# Patient Record
Sex: FEMALE | Race: WHITE | NOT HISPANIC OR LATINO | Employment: FULL TIME | URBAN - NONMETROPOLITAN AREA
[De-identification: names, ages, dates, MRNs, and addresses within clinical notes are randomized per-mention and may not be internally consistent; named-entity substitution may affect disease eponyms.]

---

## 2017-01-04 ENCOUNTER — OFFICE VISIT (OUTPATIENT)
Dept: MEDICAL GROUP | Facility: PHYSICIAN GROUP | Age: 62
End: 2017-01-04
Payer: COMMERCIAL

## 2017-01-04 VITALS
WEIGHT: 245 LBS | OXYGEN SATURATION: 94 % | RESPIRATION RATE: 16 BRPM | SYSTOLIC BLOOD PRESSURE: 126 MMHG | DIASTOLIC BLOOD PRESSURE: 90 MMHG | HEART RATE: 72 BPM | BODY MASS INDEX: 41.83 KG/M2 | TEMPERATURE: 99.7 F | HEIGHT: 64 IN

## 2017-01-04 DIAGNOSIS — I10 ESSENTIAL HYPERTENSION: ICD-10-CM

## 2017-01-04 DIAGNOSIS — E03.4 HYPOTHYROIDISM DUE TO ACQUIRED ATROPHY OF THYROID: ICD-10-CM

## 2017-01-04 DIAGNOSIS — E78.00 PURE HYPERCHOLESTEROLEMIA: ICD-10-CM

## 2017-01-04 DIAGNOSIS — E66.09 OBESITY DUE TO EXCESS CALORIES, UNSPECIFIED OBESITY SEVERITY: ICD-10-CM

## 2017-01-04 DIAGNOSIS — J06.9 VIRAL UPPER RESPIRATORY TRACT INFECTION: ICD-10-CM

## 2017-01-04 PROCEDURE — 99214 OFFICE O/P EST MOD 30 MIN: CPT | Performed by: INTERNAL MEDICINE

## 2017-01-04 RX ORDER — AMOXICILLIN 500 MG/1
500 CAPSULE ORAL 3 TIMES DAILY
Qty: 30 CAP | Refills: 0 | Status: SHIPPED | OUTPATIENT
Start: 2017-01-04 | End: 2017-10-20

## 2017-01-04 RX ORDER — LEVOTHYROXINE SODIUM 0.12 MG/1
125 TABLET ORAL
Qty: 90 TAB | Refills: 1 | Status: SHIPPED | OUTPATIENT
Start: 2017-01-04 | End: 2017-01-16

## 2017-01-04 NOTE — PATIENT INSTRUCTIONS
flovent 1 puff 2 x a day to see if chronic cough resolves    Amoxicillin 1 tab 3 x a day if URI persists for another 3 days.      My Chart if want to continue the flovent.

## 2017-01-04 NOTE — MR AVS SNAPSHOT
"        Elizabeth Mendoza   2017 8:40 AM   Office Visit   MRN: [id number]    Department:  [facility]   Dept Phone:  [phone]    Description:  Female : 1955   Provider:  Serenity HOUSE M.D.           Reason for Visit     Results labs     Cough pt states cough and headache x 1week       Allergies as of 2017     Allergen Noted Reactions    Biaxin [Clarithromycin] 10/11/2011   Vomiting    Cipro Xr 10/11/2011       Pain Relief 10/11/2011         You were diagnosed with     Obesity due to excess calories, unspecified obesity severity   [2815516]       Viral upper respiratory tract infection   [480318]       Essential hypertension   [7503319]       Pure hypercholesterolemia   [272.0.ICD-9-CM]       Hypothyroidism due to acquired atrophy of thyroid   [2925893]         Vital Signs     Blood Pressure Pulse Temperature Respirations Height Weight    126/90 mmHg 72 37.6 °C (99.7 °F) 16 1.638 m (5' 4.49\") 111.131 kg (245 lb)    Body Mass Index Oxygen Saturation Smoking Status             41.42 kg/m2 94% Never Smoker          Basic Information     Date Of Birth Sex Race Ethnicity Preferred Language    1955 Female White Non- English      Your appointments     2017  7:00 AM   Adult Draw/Collection with [facility]   [facility] (--)    [address] [place] [address]   [phone]            2017  8:00 AM   ANNUAL EXAM PREVENTATIVE with Serenity HOUSE M.D.   [facility] (--)    [address] [id number] [phone]              Problem List              ICD-10-CM Priority Class Noted - Resolved    Hypertension I10   Unknown - Present    Hyperlipidemia E78.5   Unknown - Present    Hypothyroid E03.9   Unknown - Present    Obesity E66.9 Medium  3/6/2013 - Present    Incisional hernia K43.2   3/31/2014 - Present    Reactive airway disease without complication J45.909   2014 - Present    Vitamin D deficiency E55.9   " 2/23/2016 - Present    Anxiety F41.9   2/24/2016 - Present    Viral upper respiratory tract infection J06.9, B97.89   1/4/2017 - Present      Health Maintenance        Date Due Completion Dates    IMM DTaP/Tdap/Td Vaccine (1 - Tdap) 6/27/1974 ---    PAP SMEAR 6/27/1976 ---    IMM ZOSTER VACCINE 6/27/2015 ---    IMM INFLUENZA (1) 9/1/2016 10/3/2014, 9/30/2013    MAMMOGRAM 2/12/2017 2/12/2016 (Done)    Override on 2/12/2016: Done    COLONOSCOPY 2/21/2024 2/21/2014            Current Immunizations     Influenza TIV (IM) 9/30/2013    Influenza Vaccine Quad Inj (Pf) 10/3/2014    Pneumococcal polysaccharide vaccine (PPSV-23) 9/30/2010      Below and/or attached are the medications your provider expects you to take. Review all of your home medications and newly ordered medications with your provider and/or pharmacist. Follow medication instructions as directed by your provider and/or pharmacist. Please keep your medication list with you and share with your provider. Update the information when medications are discontinued, doses are changed, or new medications (including over-the-counter products) are added; and carry medication information at all times in the event of emergency situations     Allergies:  BIAXIN - Vomiting     CIPRO XR - (reactions not documented)     PAIN RELIEF - (reactions not documented)               Medications  Valid as of: January 04, 2017 -  9:42 AM    Generic Name Brand Name Tablet Size Instructions for use    Albuterol Sulfate (Aero Soln) albuterol 108 (90 BASE) MCG/ACT Inhale 2 Puffs by mouth every 6 hours as needed for Shortness of Breath.        ALPRAZolam (Tab) XANAX 0.5 MG Take 1 Tab by mouth at bedtime as needed.        Amoxicillin (Cap) AMOXIL 500 MG Take 1 Cap by mouth 3 times a day.        Aspirin (Tab)  MG Take 325 mg by mouth every 6 hours as needed.        Calcium Polycarbophil   Take  by mouth 2 Times a Day. Takes two        Cholecalciferol (Cap) VITAMIN D3 5000 UNIT Take 1  Cap by mouth every day.        Enalapril Maleate (Tab) VASOTEC 2.5 MG Take 1 Tab by mouth every day.        Famotidine   Take  by mouth.        Fluticasone Propionate HFA (Aerosol) FLOVENT  MCG/ACT Inhale 2 Puffs by mouth 2 times a day.        Levothyroxine Sodium (Tab) SYNTHROID 150 MCG Take 1 Tab by mouth every day.        Levothyroxine Sodium (Tab) SYNTHROID 125 MCG Take 1 Tab by mouth Every morning on an empty stomach.        Omeprazole (CAPSULE DELAYED RELEASE) PRILOSEC 20 MG Take 20 mg by mouth every day.        Simvastatin (Tab) ZOCOR 20 MG Take 1 Tab by mouth every evening.        Triamterene-HCTZ (Cap) MAXZIDE-25/DYAZIDE 37.5-25 MG Take 1 Cap by mouth every day.        .                 Medicines prescribed today were sent to:     [facility]/PHARMACY #[id number] - [place], [place] - [address] [address]    Phone: [phone] Fax: [phone]    Open 24 Hours?: No    [facility] - [address] [facility] [address]    Phone: [phone] Fax: [phone]    Open 24 Hours?: No      Medication refill instructions:       If your prescription bottle indicates you have medication refills left, it is not necessary to call your provider’s office. Please contact your pharmacy and they will refill your medication.    If your prescription bottle indicates you do not have any refills left, you may request refills at any time through one of the following ways: The online [employer] system (except Urgent Care), by calling your provider’s office, or by asking your pharmacy to contact your provider’s office with a refill request. Medication refills are processed only during regular business hours and may not be available until the next business day. Your provider may request additional information or to have a follow-up visit with you prior to refilling your medication.   *Please Note: Medication refills are assigned a new Rx number  when refilled electronically. Your pharmacy may indicate that no refills were authorized even though a new prescription for the same medication is available at the pharmacy. Please request the medicine by name with the pharmacy before contacting your provider for a refill.        Your To Do List     Future Labs/Procedures Complete By Expires    CBC WITH DIFFERENTIAL  As directed 1/4/2018    COMP METABOLIC PANEL  As directed 1/4/2018    LIPID PROFILE  As directed 1/4/2018    VITAMIN D,25 HYDROXY  As directed 1/4/2018      Instructions    flovent 1 puff 2 x a day to see if chronic cough resolves    Amoxicillin 1 tab 3 x a day if URI persists for another 3 days.      My Chart if want to continue the flovent.           [employer] Access Code: Activation code not generated  Current [employer] Status: Active

## 2017-01-04 NOTE — ASSESSMENT & PLAN NOTE
Patient did not attend weight loss program, attributes weight gain to holiday.  Not exercising currently as recent URI.

## 2017-01-04 NOTE — ASSESSMENT & PLAN NOTE
Patient with 1 week of URI, cough, nonproductive, no fever or chills, states she still feels unready to go back to work, low energy.

## 2017-01-06 NOTE — PROGRESS NOTES
Chief Complaint   Patient presents with   • Results     labs    • Cough     pt states cough and headache x 1week        HISTORY OF PRESENT ILLNESS: Patient is a 61 y.o. female established patient who presents today to discuss the medical issues below.    Obesity  Patient did not attend weight loss program, attributes weight gain to holiday.  Not exercising currently as recent URI.      Viral upper respiratory tract infection  Patient with 1 week of URI, cough, nonproductive, no fever or chills, states she still feels unready to go back to work, low energy.      Hyperlipidemia  Patient with weight gain. Labs done, slight increase, on lipitor at 20 mg a day.      Hypothyroid  Patient on meds, no hair loss, constipation or diarrhea.        Patient Active Problem List    Diagnosis Date Noted   • Obesity 03/06/2013     Priority: Medium   • Viral upper respiratory tract infection 01/04/2017   • Anxiety 02/24/2016   • Vitamin D deficiency 02/23/2016   • Reactive airway disease without complication 11/12/2014   • Incisional hernia 03/31/2014   • Hypertension    • Hyperlipidemia    • Hypothyroid        Allergies:Biaxin; Cipro xr; and Pain relief    Current Outpatient Prescriptions   Medication Sig Dispense Refill   • amoxicillin (AMOXIL) 500 MG Cap Take 1 Cap by mouth 3 times a day. 30 Cap 0   • levothyroxine (SYNTHROID) 125 MCG Tab Take 1 Tab by mouth Every morning on an empty stomach. 90 Tab 1   • Famotidine (PEPCID PO) Take  by mouth.     • fluticasone (FLOVENT HFA) 110 MCG/ACT Aerosol Inhale 2 Puffs by mouth 2 times a day. 1 Inhaler 3   • enalapril (VASOTEC) 2.5 MG Tab Take 1 Tab by mouth every day. 90 Tab 3   • simvastatin (ZOCOR) 20 MG Tab Take 1 Tab by mouth every evening. 90 Tab 3   • triamterene/hctz (MAXZIDE-25/DYAZIDE) 37.5-25 MG Cap Take 1 Cap by mouth every day. 90 Cap 3   • alprazolam (XANAX) 0.5 MG Tab Take 1 Tab by mouth at bedtime as needed. 10 Tab 0   • cholecalciferol (VITAMIN D3) 5000 UNIT Cap Take 1  "Cap by mouth every day. 90 Cap 3   • albuterol (VENTOLIN OR PROVENTIL) 108 (90 BASE) MCG/ACT Aero Soln inhalation aerosol Inhale 2 Puffs by mouth every 6 hours as needed for Shortness of Breath. 8.5 g 1   • Calcium Polycarbophil (FIBERCON PO) Take  by mouth 2 Times a Day. Takes two     • aspirin (ASA) 325 MG TABS Take 325 mg by mouth every 6 hours as needed.     • levothyroxine (SYNTHROID) 150 MCG Tab Take 1 Tab by mouth every day. (Patient not taking: Reported on 2017) 30 Tab 1   • omeprazole (PRILOSEC) 20 MG CPDR Take 20 mg by mouth every day.       No current facility-administered medications for this visit.         Past Medical History   Diagnosis Date   • Hypertension    • Hyperlipidemia    • Hypothyroid    • Cholesterol blood decreased    • Heart burn    • Other specified disorder of intestines    • Bronchitis    • Diverticulitis    • Cough 2014   • Vitamin D deficiency 2016   • Anxiety 2016   • Viral upper respiratory tract infection 2017       Social History   Substance Use Topics   • Smoking status: Never Smoker    • Smokeless tobacco: Never Used   • Alcohol Use: No       Family Status   Relation Status Death Age   • Mother Alive    • Father       Family History   Problem Relation Age of Onset   • Stroke Mother    • Hypertension Mother    • Heart Disease Father 55   • Diabetes         ROS:    Respiratory: Negative for shortness of breath or wheezing.    Cardiovascular: Negative for chest pain, palpitations, orthopnea, dyspnea with exertion or edema.   Gastrointestinal: Negative for GI upset, nausea, vomiting, abdominal pain, constipation or diarrhea.   Genitourinary: Negative for dysuria, urgency, hesitancy or frequency.       Exam:    Blood pressure 126/90, pulse 72, temperature 37.6 °C (99.7 °F), resp. rate 16, height 1.638 m (5' 4.49\"), weight 111.131 kg (245 lb), SpO2 94 %.  General:  Well nourished, well developed female in NAD.  HENT: Normocephalic, bilateral TMs are " intact, nasal mucosa with diffuse edema no sinus tenderness to percussion oral mucosal lesions  Neck with no adenopathy bruit stridor  Pulmonary: Clear to ausculation and percussion.  Normal effort. No rales, rhonchi, or wheezing.  Cardiovascular: Regular rate and rhythm without murmur.   Abdomen: Normal bowel sounds soft and nontender no palpable liver spleen bladder mass.  Extremities: No LE edema noted.  Neuro: Grossly nonfocal.  Psych: Alert and oriented to person, place, and time. Appropriate mood and conversation.        This dictation was created using voice recognition software. I have made reasonable attempts to correct errors, however, errors of grammar and content may exist.          Assessment/Plan:    1. Obesity due to excess calories, unspecified obesity severity  Discussed diet, exercise, weight loss, behavioral modification, portion size management.  Weight is up off phentermine she is planning to restart diet and exercise program after she recovers from the URI  - VITAMIN D,25 HYDROXY; Future    2. Viral upper respiratory tract infection  Most likely initially viral however considering the ongoing symptomatology potential for secondary bacterial infection. Prescription for antibiotics given if she is not significantly improved in the next couple of days as amoxicillin. Discussed conservative management    3. Essential hypertension  Borderline she's not feeling well continuing on medications discussed diet exercise weight loss early follow-up consideration for increase in the enalapril when necessary persistence of borderline nature, discussed all monitoring  - COMP METABOLIC PANEL; Future  - CBC WITH DIFFERENTIAL; Future    4. Pure hypercholesterolemia  Continues on Zocor, LDL at 119 discussed increasing dose versus monitor with diet exercise weight loss. Patient declines medication changes will work on her lifestyle management.  - LIPID PROFILE; Future    5. Hypothyroidism due to acquired atrophy  of thyroid  Clinically stable, labs indicate over replaced. Discussed at length with patient decrease dose to 125 µg daily ongoing monitoring.

## 2017-01-16 RX ORDER — LEVOTHYROXINE SODIUM 0.12 MG/1
TABLET ORAL
Qty: 90 TAB | Refills: 1 | Status: SHIPPED | OUTPATIENT
Start: 2017-01-16 | End: 2017-07-06 | Stop reason: SDUPTHER

## 2017-05-08 RX ORDER — LEVOTHYROXINE SODIUM 0.12 MG/1
TABLET ORAL
OUTPATIENT
Start: 2017-05-08

## 2017-06-29 ENCOUNTER — HOSPITAL ENCOUNTER (OUTPATIENT)
Dept: LAB | Facility: MEDICAL CENTER | Age: 62
End: 2017-06-29
Attending: INTERNAL MEDICINE
Payer: COMMERCIAL

## 2017-06-29 DIAGNOSIS — I10 ESSENTIAL HYPERTENSION: ICD-10-CM

## 2017-06-29 DIAGNOSIS — E66.09 OBESITY DUE TO EXCESS CALORIES, UNSPECIFIED OBESITY SEVERITY: ICD-10-CM

## 2017-06-29 DIAGNOSIS — E78.00 PURE HYPERCHOLESTEROLEMIA: ICD-10-CM

## 2017-06-29 LAB
25(OH)D3 SERPL-MCNC: 37 NG/ML (ref 30–100)
ALBUMIN SERPL BCP-MCNC: 3.6 G/DL (ref 3.2–4.9)
ALBUMIN/GLOB SERPL: 1.2 G/DL
ALP SERPL-CCNC: 48 U/L (ref 30–99)
ALT SERPL-CCNC: 36 U/L (ref 2–50)
ANION GAP SERPL CALC-SCNC: 5 MMOL/L (ref 0–11.9)
AST SERPL-CCNC: 25 U/L (ref 12–45)
BASOPHILS # BLD AUTO: 0.8 % (ref 0–1.8)
BASOPHILS # BLD: 0.06 K/UL (ref 0–0.12)
BILIRUB SERPL-MCNC: 0.4 MG/DL (ref 0.1–1.5)
BUN SERPL-MCNC: 13 MG/DL (ref 8–22)
CALCIUM SERPL-MCNC: 8.8 MG/DL (ref 8.5–10.5)
CHLORIDE SERPL-SCNC: 106 MMOL/L (ref 96–112)
CHOLEST SERPL-MCNC: 146 MG/DL (ref 100–199)
CO2 SERPL-SCNC: 28 MMOL/L (ref 20–33)
CREAT SERPL-MCNC: 0.8 MG/DL (ref 0.5–1.4)
EOSINOPHIL # BLD AUTO: 0.34 K/UL (ref 0–0.51)
EOSINOPHIL NFR BLD: 4.7 % (ref 0–6.9)
ERYTHROCYTE [DISTWIDTH] IN BLOOD BY AUTOMATED COUNT: 41.7 FL (ref 35.9–50)
GFR SERPL CREATININE-BSD FRML MDRD: >60 ML/MIN/1.73 M 2
GLOBULIN SER CALC-MCNC: 3 G/DL (ref 1.9–3.5)
GLUCOSE SERPL-MCNC: 105 MG/DL (ref 65–99)
HCT VFR BLD AUTO: 45.9 % (ref 37–47)
HDLC SERPL-MCNC: 39 MG/DL
HGB BLD-MCNC: 14.8 G/DL (ref 12–16)
IMM GRANULOCYTES # BLD AUTO: 0.02 K/UL (ref 0–0.11)
IMM GRANULOCYTES NFR BLD AUTO: 0.3 % (ref 0–0.9)
LDLC SERPL CALC-MCNC: 76 MG/DL
LYMPHOCYTES # BLD AUTO: 2.16 K/UL (ref 1–4.8)
LYMPHOCYTES NFR BLD: 30 % (ref 22–41)
MCH RBC QN AUTO: 28.4 PG (ref 27–33)
MCHC RBC AUTO-ENTMCNC: 32.2 G/DL (ref 33.6–35)
MCV RBC AUTO: 88.1 FL (ref 81.4–97.8)
MONOCYTES # BLD AUTO: 0.53 K/UL (ref 0–0.85)
MONOCYTES NFR BLD AUTO: 7.4 % (ref 0–13.4)
NEUTROPHILS # BLD AUTO: 4.09 K/UL (ref 2–7.15)
NEUTROPHILS NFR BLD: 56.8 % (ref 44–72)
NRBC # BLD AUTO: 0 K/UL
NRBC BLD AUTO-RTO: 0 /100 WBC
PLATELET # BLD AUTO: 296 K/UL (ref 164–446)
PMV BLD AUTO: 10.1 FL (ref 9–12.9)
POTASSIUM SERPL-SCNC: 3.9 MMOL/L (ref 3.6–5.5)
PROT SERPL-MCNC: 6.6 G/DL (ref 6–8.2)
RBC # BLD AUTO: 5.21 M/UL (ref 4.2–5.4)
SODIUM SERPL-SCNC: 139 MMOL/L (ref 135–145)
TRIGL SERPL-MCNC: 157 MG/DL (ref 0–149)
WBC # BLD AUTO: 7.2 K/UL (ref 4.8–10.8)

## 2017-06-29 PROCEDURE — 82306 VITAMIN D 25 HYDROXY: CPT

## 2017-06-29 PROCEDURE — 36415 COLL VENOUS BLD VENIPUNCTURE: CPT

## 2017-06-29 PROCEDURE — 85025 COMPLETE CBC W/AUTO DIFF WBC: CPT

## 2017-06-29 PROCEDURE — 80053 COMPREHEN METABOLIC PANEL: CPT

## 2017-06-29 PROCEDURE — 80061 LIPID PANEL: CPT

## 2017-07-06 ENCOUNTER — OFFICE VISIT (OUTPATIENT)
Dept: MEDICAL GROUP | Facility: PHYSICIAN GROUP | Age: 62
End: 2017-07-06
Payer: COMMERCIAL

## 2017-07-06 VITALS
TEMPERATURE: 97.9 F | OXYGEN SATURATION: 95 % | DIASTOLIC BLOOD PRESSURE: 90 MMHG | HEART RATE: 80 BPM | BODY MASS INDEX: 47.63 KG/M2 | WEIGHT: 279 LBS | SYSTOLIC BLOOD PRESSURE: 144 MMHG | HEIGHT: 64 IN

## 2017-07-06 DIAGNOSIS — E78.00 PURE HYPERCHOLESTEROLEMIA: ICD-10-CM

## 2017-07-06 DIAGNOSIS — E03.4 HYPOTHYROIDISM DUE TO ACQUIRED ATROPHY OF THYROID: ICD-10-CM

## 2017-07-06 DIAGNOSIS — F41.9 ANXIETY: ICD-10-CM

## 2017-07-06 DIAGNOSIS — I10 ESSENTIAL HYPERTENSION: ICD-10-CM

## 2017-07-06 PROCEDURE — 99214 OFFICE O/P EST MOD 30 MIN: CPT | Performed by: INTERNAL MEDICINE

## 2017-07-06 RX ORDER — LEVOTHYROXINE SODIUM 0.12 MG/1
125 TABLET ORAL
Qty: 90 TAB | Refills: 1 | Status: SHIPPED | OUTPATIENT
Start: 2017-07-06 | End: 2017-09-23 | Stop reason: SDUPTHER

## 2017-07-06 RX ORDER — ALPRAZOLAM 0.5 MG/1
0.5 TABLET ORAL NIGHTLY PRN
Qty: 30 TAB | Refills: 0 | Status: SHIPPED | OUTPATIENT
Start: 2017-07-06 | End: 2021-01-26 | Stop reason: SDUPTHER

## 2017-07-06 RX ORDER — SIMVASTATIN 20 MG
20 TABLET ORAL EVERY EVENING
Qty: 90 TAB | Refills: 3 | Status: SHIPPED | OUTPATIENT
Start: 2017-07-06 | End: 2018-04-20 | Stop reason: SDUPTHER

## 2017-07-06 RX ORDER — ENALAPRIL MALEATE 2.5 MG/1
2.5 TABLET ORAL DAILY
Qty: 90 TAB | Refills: 3 | Status: SHIPPED | OUTPATIENT
Start: 2017-07-06 | End: 2018-04-20 | Stop reason: SDUPTHER

## 2017-07-06 RX ORDER — TRIAMTERENE AND HYDROCHLOROTHIAZIDE 37.5; 25 MG/1; MG/1
2 CAPSULE ORAL DAILY
Qty: 180 CAP | Refills: 3 | Status: SHIPPED | OUTPATIENT
Start: 2017-07-06 | End: 2018-04-20 | Stop reason: SDUPTHER

## 2017-07-06 ASSESSMENT — PATIENT HEALTH QUESTIONNAIRE - PHQ9: CLINICAL INTERPRETATION OF PHQ2 SCORE: 0

## 2017-07-06 ASSESSMENT — PAIN SCALES - GENERAL: PAINLEVEL: NO PAIN

## 2017-07-06 NOTE — ASSESSMENT & PLAN NOTE
Patient has insight that once she starts eating food she has trouble stopping but no clear connection to anxiety.  She has new grandbaby who had pyloric stenosis. Rare xanax, mostly for prn.  She does have anger frustration mostly at work.  She has had SSRI 8 years ago with divorce.

## 2017-07-06 NOTE — MR AVS SNAPSHOT
"        Elizabeth Mendoza   2017 8:00 AM   Office Visit   MRN: [id number]    Department:  [facility]   Dept Phone:  [phone]    Description:  Female : 1955   Provider:  Serenity HOUSE M.D.           Reason for Visit     Results labs     Medication Refill levothyroxine, enalapril, simvastain, triamterine hctz, xanax      Allergies as of 2017     Allergen Noted Reactions    Biaxin [Clarithromycin] 10/11/2011   Vomiting    Cipro Xr 10/11/2011       Pain Relief 10/11/2011         You were diagnosed with     Essential hypertension   [1182226]       Hypothyroidism due to acquired atrophy of thyroid   [7522508]       Pure hypercholesterolemia   [272.0.ICD-9-CM]       Anxiety   [396460]         Vital Signs     Blood Pressure Pulse Temperature Height Weight Body Mass Index    144/90 mmHg 80 36.6 °C (97.9 °F) 1.638 m (5' 4.49\") 126.554 kg (279 lb) 47.17 kg/m2    Oxygen Saturation Smoking Status                95% Never Smoker           Basic Information     Date Of Birth Sex Race Ethnicity Preferred Language    1955 Female White Non- English      Your appointments     Oct 11, 2017  7:00 AM   Adult Draw/Collection with [facility]   [facility] (--)    [address]  [place] [address]   [phone]            Oct 20, 2017  4:30 PM   Established Patient with Serenity HOUSE M.D.   [facility] (--)    [address]  [place] [id number] [phone]           You will be receiving a confirmation call a few days before your appointment from our automated call confirmation system.              Problem List              ICD-10-CM Priority Class Noted - Resolved    Hypertension I10   Unknown - Present    Hyperlipidemia E78.5   Unknown - Present    Hypothyroid E03.9   Unknown - Present    Obesity E66.9 Medium  3/6/2013 - Present    Incisional hernia K43.2   3/31/2014 - Present    Reactive airway disease without complication J45.909   2014 - " Present    Vitamin D deficiency E55.9   2/23/2016 - Present    Anxiety F41.9   2/24/2016 - Present    Viral upper respiratory tract infection J06.9, B97.89   1/4/2017 - Present      Health Maintenance        Date Due Completion Dates    IMM DTaP/Tdap/Td Vaccine (1 - Tdap) 6/27/1974 ---    PAP SMEAR 6/27/1976 ---    IMM ZOSTER VACCINE 6/27/2015 ---    MAMMOGRAM 2/12/2017 2/12/2016 (Done)    Override on 2/12/2016: Done    IMM INFLUENZA (1) 9/1/2017 10/3/2014, 9/30/2013    COLONOSCOPY 2/21/2024 2/21/2014            Current Immunizations     Influenza TIV (IM) 9/30/2013    Influenza Vaccine Quad Inj (Pf) 10/3/2014    Pneumococcal polysaccharide vaccine (PPSV-23) 9/30/2010      Below and/or attached are the medications your provider expects you to take. Review all of your home medications and newly ordered medications with your provider and/or pharmacist. Follow medication instructions as directed by your provider and/or pharmacist. Please keep your medication list with you and share with your provider. Update the information when medications are discontinued, doses are changed, or new medications (including over-the-counter products) are added; and carry medication information at all times in the event of emergency situations     Allergies:  BIAXIN - Vomiting     CIPRO XR - (reactions not documented)     PAIN RELIEF - (reactions not documented)               Medications  Valid as of: July 06, 2017 -  9:00 AM    Generic Name Brand Name Tablet Size Instructions for use    Albuterol Sulfate (Aero Soln) albuterol 108 (90 BASE) MCG/ACT Inhale 2 Puffs by mouth every 6 hours as needed for Shortness of Breath.        ALPRAZolam (Tab) XANAX 0.5 MG Take 1 Tab by mouth at bedtime as needed.        Amoxicillin (Cap) AMOXIL 500 MG Take 1 Cap by mouth 3 times a day.        Aspirin (Tab)  MG Take 325 mg by mouth every 6 hours as needed.        Calcium Polycarbophil   Take  by mouth 2 Times a Day. Takes two        Cholecalciferol (Cap) VITAMIN D3 5000 UNIT Take 1 Cap by mouth every day.        Enalapril Maleate (Tab) VASOTEC 2.5 MG Take 1 Tab by mouth every day.        Famotidine   Take  by mouth.        Fluticasone Propionate HFA (Aerosol) FLOVENT  MCG/ACT Inhale 2 Puffs by mouth 2 times a day.        Levothyroxine Sodium (Tab) SYNTHROID 125 MCG Take 1 Tab by mouth every morning before breakfast.        Omeprazole (CAPSULE DELAYED RELEASE) PRILOSEC 20 MG Take 20 mg by mouth every day.        Simvastatin (Tab) ZOCOR 20 MG Take 1 Tab by mouth every evening.        Triamterene-HCTZ (Cap) MAXZIDE-25/DYAZIDE 37.5-25 MG Take 2 Caps by mouth every day.        .                 Medicines prescribed today were sent to:     [facility]/PHARMACY #[id number] - [place], [place] - [address] [address]    Phone: [phone] Fax: [phone]    Open 24 Hours?: No    [facility] - [address] [facility] [address]    Phone: [phone] Fax: [phone]    Open 24 Hours?: No      Medication refill instructions:       If your prescription bottle indicates you have medication refills left, it is not necessary to call your provider’s office. Please contact your pharmacy and they will refill your medication.    If your prescription bottle indicates you do not have any refills left, you may request refills at any time through one of the following ways: The online [employer] system (except Urgent Care), by calling your provider’s office, or by asking your pharmacy to contact your provider’s office with a refill request. Medication refills are processed only during regular business hours and may not be available until the next business day. Your provider may request additional information or to have a follow-up visit with you prior to refilling your medication.   *Please Note: Medication refills are assigned a new Rx number when refilled electronically. Your  pharmacy may indicate that no refills were authorized even though a new prescription for the same medication is available at the pharmacy. Please request the medicine by name with the pharmacy before contacting your provider for a refill.        Your To Do List     Future Labs/Procedures Complete By Expires    FREE THYROXINE  As directed 7/6/2018    TSH  As directed 7/6/2018      Referral     A referral request has been sent to our patient care coordination department. Please allow 3-5 business days for us to process this request and contact you either by phone or mail. If you do not hear from us by the 5th business day, please call us at [phone].           [employer] Access Code: Activation code not generated  Current [employer] Status: Active

## 2017-07-06 NOTE — PROGRESS NOTES
Chief Complaint   Patient presents with   • Results     labs    • Medication Refill     levothyroxine, enalapril, simvastain, triamterine hctz, xanax       HISTORY OF PRESENT ILLNESS: Patient is a 62 y.o. female established patient who presents today to discuss the medical issues below.    Hypertension  Home readings up a bit with the heat and some ankle edema.      Hypothyroid  Patient currently taking the .125 mcg dose, labs done but thyroid not rechecked. weight is up but hair is ok.      Hyperlipidemia  Continues on the simvastatin, had labs, no aching.      Anxiety  Patient has insight that once she starts eating food she has trouble stopping but no clear connection to anxiety.  She has new grandbaby who had pyloric stenosis. Rare xanax, mostly for prn.  She does have anger frustration mostly at work.  She has had SSRI 8 years ago with divorce.        Patient Active Problem List    Diagnosis Date Noted   • Obesity 03/06/2013     Priority: Medium   • Viral upper respiratory tract infection 01/04/2017   • Anxiety 02/24/2016   • Vitamin D deficiency 02/23/2016   • Reactive airway disease without complication 11/12/2014   • Incisional hernia 03/31/2014   • Hypertension    • Hyperlipidemia    • Hypothyroid        Allergies:Biaxin; Cipro xr; and Pain relief    Current Outpatient Prescriptions   Medication Sig Dispense Refill   • levothyroxine (SYNTHROID) 125 MCG Tab TAKE 1 TAB BY MOUTH EVERY MORNING ON AN EMPTY STOMACH. 90 Tab 1   • Famotidine (PEPCID PO) Take  by mouth.     • enalapril (VASOTEC) 2.5 MG Tab Take 1 Tab by mouth every day. 90 Tab 3   • simvastatin (ZOCOR) 20 MG Tab Take 1 Tab by mouth every evening. 90 Tab 3   • triamterene/hctz (MAXZIDE-25/DYAZIDE) 37.5-25 MG Cap Take 1 Cap by mouth every day. 90 Cap 3   • alprazolam (XANAX) 0.5 MG Tab Take 1 Tab by mouth at bedtime as needed. 10 Tab 0   • cholecalciferol (VITAMIN D3) 5000 UNIT Cap Take 1 Cap by mouth every day. 90 Cap 3   • albuterol (VENTOLIN OR  "PROVENTIL) 108 (90 BASE) MCG/ACT Aero Soln inhalation aerosol Inhale 2 Puffs by mouth every 6 hours as needed for Shortness of Breath. 8.5 g 1   • Calcium Polycarbophil (FIBERCON PO) Take  by mouth 2 Times a Day. Takes two     • aspirin (ASA) 325 MG TABS Take 325 mg by mouth every 6 hours as needed.     • amoxicillin (AMOXIL) 500 MG Cap Take 1 Cap by mouth 3 times a day. (Patient not taking: Reported on 2017) 30 Cap 0   • fluticasone (FLOVENT HFA) 110 MCG/ACT Aerosol Inhale 2 Puffs by mouth 2 times a day. 1 Inhaler 3   • omeprazole (PRILOSEC) 20 MG CPDR Take 20 mg by mouth every day.       No current facility-administered medications for this visit.         Past Medical History   Diagnosis Date   • Hypertension    • Hyperlipidemia    • Hypothyroid    • Cholesterol blood decreased    • Heart burn    • Other specified disorder of intestines    • Bronchitis    • Diverticulitis    • Cough 2014   • Vitamin D deficiency 2016   • Anxiety 2016   • Viral upper respiratory tract infection 2017       Social History   Substance Use Topics   • Smoking status: Never Smoker    • Smokeless tobacco: Never Used   • Alcohol Use: No       Family Status   Relation Status Death Age   • Mother Alive    • Father       Family History   Problem Relation Age of Onset   • Stroke Mother    • Hypertension Mother    • Heart Disease Father 55   • Diabetes         ROS:    Respiratory: Negative for cough, sputum production, shortness of breath or wheezing.    Cardiovascular: Negative for chest pain, palpitations, orthopnea, dyspnea with exertion or edema.   Gastrointestinal: Negative for GI upset, nausea, vomiting, abdominal pain, constipation or diarrhea.   Genitourinary: Negative for dysuria, urgency, hesitancy or frequency.       Exam:    Blood pressure 144/90, pulse 80, temperature 36.6 °C (97.9 °F), height 1.638 m (5' 4.49\"), weight 126.554 kg (279 lb), SpO2 95 %.  General:  Well nourished, well developed female " in NAD.  Pulmonary: Clear to ausculation and percussion.  Normal effort. No rales, rhonchi, or wheezing.  Cardiovascular: Regular rate and rhythm without murmur.   Abdomen: Normal bowel sounds soft and nontender no palpable liver spleen bladder mass.  Extremities: No LE edema noted.  Neuro: Grossly nonfocal.  Psych: Alert and oriented to person, place, and time. Appropriate mood and conversation.    LABS: Results reviewed and discussed with the patient, questions answered.      This dictation was created using voice recognition software. I have made reasonable attempts to correct errors, however, errors of grammar and content may exist.          Assessment/Plan:    1. Essential hypertension  Borderline discussed diet exercise weight loss    2. Hypothyroidism due to acquired atrophy of thyroid  Clinically euthyroid unfortunately TSH not done will attach to next labs no change to dosing for now    3. Pure hypercholesterolemia  LDL cholesterol well controlled on meds liver function normal continue Zocor    4. Anxiety  Patient with minimal insight into underlying anxiety stress issues motivation for difficulty with controlling diet behavior options discussed referral to behavioral health. She is very reticent to pursue this however encouraged. Follow-up in 3 months. Patient indicates she may cancel the appointment and this is to be supported.     Patient was seen for  25 minutes face to face of which more than 50% of the time was spent in counseling and coordination of care regarding the above problems.

## 2017-07-06 NOTE — ASSESSMENT & PLAN NOTE
Patient currently taking the .125 mcg dose, labs done but thyroid not rechecked. weight is up but hair is ok.

## 2017-09-25 RX ORDER — LEVOTHYROXINE SODIUM 0.12 MG/1
TABLET ORAL
Qty: 90 TAB | Refills: 0 | Status: SHIPPED | OUTPATIENT
Start: 2017-09-25 | End: 2017-10-20 | Stop reason: SDUPTHER

## 2017-10-11 ENCOUNTER — HOSPITAL ENCOUNTER (OUTPATIENT)
Dept: LAB | Facility: MEDICAL CENTER | Age: 62
End: 2017-10-11
Attending: INTERNAL MEDICINE
Payer: COMMERCIAL

## 2017-10-11 DIAGNOSIS — E03.4 HYPOTHYROIDISM DUE TO ACQUIRED ATROPHY OF THYROID: ICD-10-CM

## 2017-10-11 LAB
T4 FREE SERPL-MCNC: 0.86 NG/DL (ref 0.53–1.43)
TSH SERPL DL<=0.005 MIU/L-ACNC: 2.83 UIU/ML (ref 0.3–3.7)

## 2017-10-11 PROCEDURE — 84439 ASSAY OF FREE THYROXINE: CPT

## 2017-10-11 PROCEDURE — 36415 COLL VENOUS BLD VENIPUNCTURE: CPT

## 2017-10-11 PROCEDURE — 84443 ASSAY THYROID STIM HORMONE: CPT

## 2017-10-20 ENCOUNTER — OFFICE VISIT (OUTPATIENT)
Dept: MEDICAL GROUP | Facility: PHYSICIAN GROUP | Age: 62
End: 2017-10-20
Payer: COMMERCIAL

## 2017-10-20 VITALS
SYSTOLIC BLOOD PRESSURE: 130 MMHG | BODY MASS INDEX: 47.92 KG/M2 | DIASTOLIC BLOOD PRESSURE: 76 MMHG | RESPIRATION RATE: 20 BRPM | HEART RATE: 87 BPM | HEIGHT: 65 IN | OXYGEN SATURATION: 97 % | WEIGHT: 287.6 LBS | TEMPERATURE: 98.4 F

## 2017-10-20 DIAGNOSIS — F41.9 ANXIETY: ICD-10-CM

## 2017-10-20 DIAGNOSIS — I10 ESSENTIAL HYPERTENSION: ICD-10-CM

## 2017-10-20 DIAGNOSIS — E78.00 PURE HYPERCHOLESTEROLEMIA: ICD-10-CM

## 2017-10-20 DIAGNOSIS — E55.9 VITAMIN D DEFICIENCY: ICD-10-CM

## 2017-10-20 DIAGNOSIS — E03.4 HYPOTHYROIDISM DUE TO ACQUIRED ATROPHY OF THYROID: ICD-10-CM

## 2017-10-20 PROCEDURE — 99213 OFFICE O/P EST LOW 20 MIN: CPT | Performed by: INTERNAL MEDICINE

## 2017-10-20 RX ORDER — LEVOTHYROXINE SODIUM 0.12 MG/1
125 TABLET ORAL
Qty: 90 TAB | Refills: 3 | Status: SHIPPED | OUTPATIENT
Start: 2017-10-20 | End: 2018-04-20 | Stop reason: SDUPTHER

## 2017-10-21 NOTE — PROGRESS NOTES
Chief Complaint   Patient presents with   • Results     labs   • Medication Refill       HISTORY OF PRESENT ILLNESS: Patient is a 62 y.o. female established patient who presents today to discuss the medical issues below.    Hypertension  patietn continues on enalapril and HCTZ, had labs, not following at home. Weight is up a bit 4#    Obesity  patient is working on diet, planning some exercise changes.      Hypothyroid  Patient feels the same, had thyroid labs checked.      Anxiety  Some improved insight.  Some weight gain.       Patient Active Problem List    Diagnosis Date Noted   • Obesity 03/06/2013     Priority: Medium   • Viral upper respiratory tract infection 01/04/2017   • Anxiety 02/24/2016   • Vitamin D deficiency 02/23/2016   • Reactive airway disease without complication 11/12/2014   • Incisional hernia 03/31/2014   • Hypertension    • Hyperlipidemia    • Hypothyroid        Allergies:Biaxin [clarithromycin]; Cipro xr; and Pain relief    Current Outpatient Prescriptions   Medication Sig Dispense Refill   • levothyroxine (SYNTHROID) 125 MCG Tab Take 1 Tab by mouth every morning before breakfast. 90 Tab 3   • triamterene/hctz (MAXZIDE-25/DYAZIDE) 37.5-25 MG Cap Take 2 Caps by mouth every day. 180 Cap 3   • enalapril (VASOTEC) 2.5 MG Tab Take 1 Tab by mouth every day. 90 Tab 3   • simvastatin (ZOCOR) 20 MG Tab Take 1 Tab by mouth every evening. 90 Tab 3   • Famotidine (PEPCID PO) Take  by mouth.     • cholecalciferol (VITAMIN D3) 5000 UNIT Cap Take 1 Cap by mouth every day. 90 Cap 3   • aspirin (ASA) 325 MG TABS Take 325 mg by mouth every 6 hours as needed.     • alprazolam (XANAX) 0.5 MG Tab Take 1 Tab by mouth at bedtime as needed. 30 Tab 0   • fluticasone (FLOVENT HFA) 110 MCG/ACT Aerosol Inhale 2 Puffs by mouth 2 times a day. 1 Inhaler 3   • albuterol (VENTOLIN OR PROVENTIL) 108 (90 BASE) MCG/ACT Aero Soln inhalation aerosol Inhale 2 Puffs by mouth every 6 hours as needed for Shortness of Breath. 8.5  "g 1   • Calcium Polycarbophil (FIBERCON PO) Take  by mouth 2 Times a Day. Takes two     • omeprazole (PRILOSEC) 20 MG CPDR Take 20 mg by mouth every day.       No current facility-administered medications for this visit.          Past Medical History:   Diagnosis Date   • Anxiety 2016   • Bronchitis    • Cholesterol blood decreased    • Cough 2014   • Diverticulitis    • Heart burn    • Hyperlipidemia    • Hypertension    • Hypothyroid    • Other specified disorder of intestines    • Viral upper respiratory tract infection 2017   • Vitamin D deficiency 2016       Social History   Substance Use Topics   • Smoking status: Never Smoker   • Smokeless tobacco: Never Used   • Alcohol use No       Family Status   Relation Status   • Mother Alive   • Father    •       Family History   Problem Relation Age of Onset   • Stroke Mother    • Hypertension Mother    • Heart Disease Father 55   • Diabetes         ROS:    Respiratory: Negative for cough, sputum production, shortness of breath or wheezing.    Cardiovascular: Negative for chest pain, palpitations, orthopnea, dyspnea with exertion or edema.   Gastrointestinal: Negative for GI upset, nausea, vomiting, abdominal pain, constipation or diarrhea.   Genitourinary: Negative for dysuria, urgency, hesitancy or frequency.       Exam:    Blood pressure 130/76, pulse 87, temperature 36.9 °C (98.4 °F), resp. rate 20, height 1.651 m (5' 5\"), weight (!) 130.5 kg (287 lb 9.6 oz), SpO2 97 %.  General:  Well nourished, well developed female in NAD.  HENT: Normocephalic, bilateral TMs are intact, nasal and oral mucosa with no lesions,   Neck: Supple without bruit. Thyroid is not enlarged.  Pulmonary: Clear to ausculation and percussion.  Normal effort. No rales, rhonchi, or wheezing.  Cardiovascular: Regular rate and rhythm without murmur.   Abdomen: Normal bowel sounds soft and nontender no palpable liver spleen bladder mass.  Extremities: No LE edema " noted.  Neuro: Grossly nonfocal.  Psych: Alert and oriented to person, place, and time. Appropriate mood and conversation.    LABS: Results reviewed and discussed with the patient, questions answered.      This dictation was created using voice recognition software. I have made reasonable attempts to correct errors, however, errors of grammar and content may exist.          Assessment/Plan:    1. Essential hypertension  Blood pressure was well controlled continue medications reviewed meds she does not need refills  - COMP METABOLIC PANEL; Future  - CBC WITH DIFFERENTIAL; Future    2. Hypothyroidism due to acquired atrophy of thyroid  Clinically and lab euthyroid refills sent ongoing lab monitoring  - TSH WITH REFLEX TO FT4; Future    3. Anxiety  Doing a bit better with stress management continue support and monitor no evidence of anxiety or insomnia of clinical significance currently.    4. Pure hypercholesterolemia  Working on diet ongoing monitoring his medications as appropriate  - LIPID PROFILE; Future    5. Vitamin D deficiency  On supplementation  - VITAMIN D,25 HYDROXY; Future    Patient was seen for 15 minutes face to face of which more than 50% of the time was spent in counseling and coordination of care regarding the above problems.

## 2018-01-06 ENCOUNTER — OFFICE VISIT (OUTPATIENT)
Dept: URGENT CARE | Facility: PHYSICIAN GROUP | Age: 63
End: 2018-01-06
Payer: COMMERCIAL

## 2018-01-06 VITALS
HEART RATE: 92 BPM | OXYGEN SATURATION: 94 % | WEIGHT: 293 LBS | BODY MASS INDEX: 49.59 KG/M2 | SYSTOLIC BLOOD PRESSURE: 138 MMHG | RESPIRATION RATE: 20 BRPM | DIASTOLIC BLOOD PRESSURE: 80 MMHG | TEMPERATURE: 98.6 F

## 2018-01-06 DIAGNOSIS — H92.01 ACUTE OTALGIA, RIGHT: ICD-10-CM

## 2018-01-06 PROCEDURE — 99213 OFFICE O/P EST LOW 20 MIN: CPT | Performed by: PHYSICIAN ASSISTANT

## 2018-01-06 ASSESSMENT — ENCOUNTER SYMPTOMS
SORE THROAT: 0
FEVER: 0
RHINORRHEA: 0
HEADACHES: 0
SINUS PAIN: 0
CHILLS: 0
COUGH: 1

## 2018-01-06 NOTE — PROGRESS NOTES
Subjective:      Elizabeth Mendoza is a 62 y.o. female who presents with Otalgia (Right side)            Fluctuating, gradually worsening right ear pain for the last couple of days. No other complaints.      Otalgia    There is pain in the right ear. This is a new problem. The current episode started in the past 7 days. The problem occurs constantly. The problem has been gradually worsening. The pain is moderate. Associated symptoms include coughing. Pertinent negatives include no ear discharge, headaches, hearing loss, rhinorrhea or sore throat. She has tried nothing for the symptoms. The treatment provided no relief.       Review of Systems   Constitutional: Negative for chills and fever.   HENT: Positive for ear pain. Negative for congestion, ear discharge, hearing loss, rhinorrhea, sinus pain and sore throat.    Respiratory: Positive for cough.    Neurological: Negative for headaches.     Allergies:Biaxin [clarithromycin]; Cipro xr; and Pain relief    Current Outpatient Prescriptions Ordered in [facility]   Medication Sig Dispense Refill   • levothyroxine (SYNTHROID) 125 MCG Tab Take 1 Tab by mouth every morning before breakfast. 90 Tab 3   • triamterene/hctz (MAXZIDE-25/DYAZIDE) 37.5-25 MG Cap Take 2 Caps by mouth every day. 180 Cap 3   • enalapril (VASOTEC) 2.5 MG Tab Take 1 Tab by mouth every day. 90 Tab 3   • simvastatin (ZOCOR) 20 MG Tab Take 1 Tab by mouth every evening. 90 Tab 3   • Famotidine (PEPCID PO) Take  by mouth.     • cholecalciferol (VITAMIN D3) 5000 UNIT Cap Take 1 Cap by mouth every day. 90 Cap 3   • aspirin (ASA) 325 MG TABS Take 325 mg by mouth every 6 hours as needed.     • alprazolam (XANAX) 0.5 MG Tab Take 1 Tab by mouth at bedtime as needed. 30 Tab 0   • fluticasone (FLOVENT HFA) 110 MCG/ACT Aerosol Inhale 2 Puffs by mouth 2 times a day. 1 Inhaler 3   • albuterol (VENTOLIN OR PROVENTIL) 108 (90 BASE) MCG/ACT Aero Soln inhalation aerosol Inhale 2 Puffs by mouth every 6 hours as needed for  Shortness of Breath. 8.5 g 1   • Calcium Polycarbophil (FIBERCON PO) Take  by mouth 2 Times a Day. Takes two     • omeprazole (PRILOSEC) 20 MG CPDR Take 20 mg by mouth every day.       No current [facility]-ordered facility-administered medications on file.        Past Medical History:   Diagnosis Date   • Anxiety 2016   • Bronchitis    • Cholesterol blood decreased    • Cough 2014   • Diverticulitis    • Heart burn    • Hyperlipidemia    • Hypertension    • Hypothyroid    • Other specified disorder of intestines    • Viral upper respiratory tract infection 2017   • Vitamin D deficiency 2016       Social History   Substance Use Topics   • Smoking status: Never Smoker   • Smokeless tobacco: Never Used   • Alcohol use No       Family Status   Relation Status   • Mother Alive   • Father    •       Family History   Problem Relation Age of Onset   • Stroke Mother    • Hypertension Mother    • Heart Disease Father 55   • Diabetes              Objective:     /80   Pulse 92   Temp 37 °C (98.6 °F)   Resp 20   Wt (!) 135.2 kg (298 lb)   SpO2 94%   BMI 49.59 kg/m²      Physical Exam   Constitutional: She is oriented to person, place, and time. She appears well-developed and well-nourished. No distress.   HENT:   Head: Normocephalic and atraumatic.   Right Ear: External ear normal.   Left Ear: External ear normal.   Mouth/Throat: Oropharynx is clear and moist.   Canals and tympanic membranes unremarkable. Mild nasal mucosal edema. No sinus tenderness   Eyes: Right eye exhibits no discharge. Left eye exhibits no discharge.   Neck: Normal range of motion. Neck supple.   Cardiovascular: Normal rate and regular rhythm.    Pulmonary/Chest: Effort normal and breath sounds normal. She has no wheezes. She has no rales.   Neurological: She is alert and oriented to person, place, and time.   Skin: Skin is warm and dry. She is not diaphoretic.   Psychiatric: She has a normal mood and affect. Her behavior  is normal. Judgment and thought content normal.   Nursing note and vitals reviewed.              Assessment/Plan:     1. Acute otalgia, right      Ongoing for a couple of days. Canals and tympanic membranes unremarkable. Given written instructions. Follow-up with PCP as needed       [employer] Patient Education given: [email or web address]    Please note that this dictation was created using voice recognition software. I have made every reasonable attempt to correct obvious errors, but I expect that there are errors of grammar and possibly content that I did not discover before finalizing the note.

## 2018-01-06 NOTE — PATIENT INSTRUCTIONS
Otalgia  Otalgia is pain in or around the ear. When the pain is from the ear itself it is called primary otalgia. Pain may also be coming from somewhere else, like the head and neck. This is called secondary otalgia.   CAUSES   Causes of primary otalgia include:  · Middle ear infection.  · It can also be caused by injury to the ear or infection of the ear canal (swimmer's ear). Swimmer's ear causes pain, swelling and often drainage from the ear canal.  Causes of secondary otalgia include:  · Sinus infections.  · Allergies and colds that cause stuffiness of the nose and tubes that drain the ears (eustachian tubes).  · Dental problems like cavities, gum infections or teething.  · Sore Throat (tonsillitis and pharyngitis).  · Swollen glands in the neck.  · Infection of the bone behind the ear (mastoiditis).  · TMJ discomfort (problems with the joint between your jaw and your skull).  · Other problems such as nerve disorders, circulation problems, heart disease and tumors of the head and neck can also cause symptoms of ear pain. This is rare.  DIAGNOSIS   Evaluation, Diagnosis and Testing:  · Examination by your medical caregiver is recommended to evaluate and diagnose the cause of otalgia.  · Further testing or referral to a specialist may be indicated if the cause of the ear pain is not found and the symptom persists.  TREATMENT   · Your doctor may prescribe antibiotics if an ear infection is diagnosed.  · Pain relievers and topical analgesics may be recommended.  · It is important to take all medications as prescribed.  HOME CARE INSTRUCTIONS   · It may be helpful to sleep with the painful ear in the up position.  · A warm compress over the painful ear may provide relief.  · A soft diet and avoiding gum may help while ear pain is present.  SEEK IMMEDIATE MEDICAL CARE IF:  · You develop severe pain, a high fever, repeated vomiting or dehydration.  · You develop extreme dizziness, headache, confusion, ringing in the  ears (tinnitus) or hearing loss.  Document Released: 01/25/2006 Document Revised: 03/11/2013 Document Reviewed: 10/27/2010  [employer]® Patient Information ©2014 [employer], [employer].

## 2018-02-14 ENCOUNTER — OFFICE VISIT (OUTPATIENT)
Dept: URGENT CARE | Facility: PHYSICIAN GROUP | Age: 63
End: 2018-02-14
Payer: COMMERCIAL

## 2018-02-14 VITALS
SYSTOLIC BLOOD PRESSURE: 130 MMHG | DIASTOLIC BLOOD PRESSURE: 78 MMHG | BODY MASS INDEX: 48.82 KG/M2 | TEMPERATURE: 98.6 F | WEIGHT: 293 LBS | HEART RATE: 101 BPM | RESPIRATION RATE: 18 BRPM | OXYGEN SATURATION: 93 % | HEIGHT: 65 IN

## 2018-02-14 DIAGNOSIS — J98.8 WHEEZING-ASSOCIATED RESPIRATORY INFECTION (WARI): Primary | ICD-10-CM

## 2018-02-14 DIAGNOSIS — E66.01 MORBID OBESITY WITH BMI OF 45.0-49.9, ADULT (HCC): ICD-10-CM

## 2018-02-14 PROCEDURE — 99214 OFFICE O/P EST MOD 30 MIN: CPT | Performed by: PHYSICIAN ASSISTANT

## 2018-02-14 RX ORDER — DOXYCYCLINE HYCLATE 100 MG
100 TABLET ORAL 2 TIMES DAILY
Qty: 20 TAB | Refills: 0 | Status: SHIPPED | OUTPATIENT
Start: 2018-02-14 | End: 2018-02-24

## 2018-02-14 RX ORDER — BENZONATATE 200 MG/1
200 CAPSULE ORAL 3 TIMES DAILY PRN
Qty: 60 CAP | Refills: 0 | Status: SHIPPED | OUTPATIENT
Start: 2018-02-14 | End: 2019-10-28

## 2018-02-14 RX ORDER — ALBUTEROL SULFATE 90 UG/1
2 AEROSOL, METERED RESPIRATORY (INHALATION) EVERY 4 HOURS PRN
Qty: 1 INHALER | Refills: 0 | Status: SHIPPED | OUTPATIENT
Start: 2018-02-14 | End: 2019-10-28

## 2018-02-14 NOTE — PROGRESS NOTES
Chief Complaint   Patient presents with   • Cough     x2days       HISTORY OF PRESENT ILLNESS: Patient is a 62 y.o. female who presents today because she has a 3-5 day history of worsening cough, wheezing, shortness of breath, phlegm production. She has tried some over-the-counter cough and cold medication without improvement.    Patient Active Problem List    Diagnosis Date Noted   • Obesity 03/06/2013     Priority: Medium   • Morbid obesity with BMI of 45.0-49.9, adult ([facility]) 02/14/2018   • Viral upper respiratory tract infection 01/04/2017   • Anxiety 02/24/2016   • Vitamin D deficiency 02/23/2016   • Reactive airway disease without complication 11/12/2014   • Incisional hernia 03/31/2014   • Hypertension    • Hyperlipidemia    • Hypothyroid        Allergies:Biaxin [clarithromycin]; Cipro xr; and Pain relief    Current Outpatient Prescriptions Ordered in [facility]   Medication Sig Dispense Refill   • albuterol 108 (90 Base) MCG/ACT Aero Soln inhalation aerosol Inhale 2 Puffs by mouth every four hours as needed. 1 Inhaler 0   • benzonatate (TESSALON) 200 MG capsule Take 1 Cap by mouth 3 times a day as needed for Cough. 60 Cap 0   • doxycycline (VIBRAMYCIN) 100 MG Tab Take 1 Tab by mouth 2 times a day for 10 days. 20 Tab 0   • levothyroxine (SYNTHROID) 125 MCG Tab Take 1 Tab by mouth every morning before breakfast. 90 Tab 3   • alprazolam (XANAX) 0.5 MG Tab Take 1 Tab by mouth at bedtime as needed. 30 Tab 0   • triamterene/hctz (MAXZIDE-25/DYAZIDE) 37.5-25 MG Cap Take 2 Caps by mouth every day. 180 Cap 3   • enalapril (VASOTEC) 2.5 MG Tab Take 1 Tab by mouth every day. 90 Tab 3   • simvastatin (ZOCOR) 20 MG Tab Take 1 Tab by mouth every evening. 90 Tab 3   • Famotidine (PEPCID PO) Take  by mouth.     • cholecalciferol (VITAMIN D3) 5000 UNIT Cap Take 1 Cap by mouth every day. 90 Cap 3   • albuterol (VENTOLIN OR PROVENTIL) 108 (90 BASE) MCG/ACT Aero Soln inhalation aerosol Inhale 2 Puffs by mouth every 6 hours as needed  "for Shortness of Breath. 8.5 g 1   • aspirin (ASA) 325 MG TABS Take 325 mg by mouth every 6 hours as needed.     • fluticasone (FLOVENT HFA) 110 MCG/ACT Aerosol Inhale 2 Puffs by mouth 2 times a day. 1 Inhaler 3   • Calcium Polycarbophil (FIBERCON PO) Take  by mouth 2 Times a Day. Takes two     • omeprazole (PRILOSEC) 20 MG CPDR Take 20 mg by mouth every day.       No current [facility]-ordered facility-administered medications on file.        Past Medical History:   Diagnosis Date   • Anxiety 2016   • Bronchitis    • Cholesterol blood decreased    • Cough 2014   • Diverticulitis    • Heart burn    • Hyperlipidemia    • Hypertension    • Hypothyroid    • Other specified disorder of intestines    • Viral upper respiratory tract infection 2017   • Vitamin D deficiency 2016       Social History   Substance Use Topics   • Smoking status: Never Smoker   • Smokeless tobacco: Never Used   • Alcohol use No       Family Status   Relation Status   • Mother Alive   • Father    •       Family History   Problem Relation Age of Onset   • Stroke Mother    • Hypertension Mother    • Heart Disease Father 55   • Diabetes         ROS:  Review of Systems   Constitutional: Negative for fever, chills, weight loss and malaise/fatigue.   HENT: Negative for ear pain, nosebleeds, congestion, sore throat and neck pain.    Eyes: Negative for blurred vision.   Respiratory: Positive for cough, sputum production, shortness of breath and wheezing.    Cardiovascular: Negative for chest pain, palpitations, orthopnea and leg swelling.   Gastrointestinal: Negative for heartburn, nausea, vomiting and abdominal pain.   Genitourinary: Negative for dysuria, urgency and frequency.     Exam:  Blood pressure 130/78, pulse (!) 101, temperature 37 °C (98.6 °F), resp. rate 18, height 1.651 m (5' 5\"), weight (!) 135.2 kg (298 lb), SpO2 93 %, not currently breastfeeding.  General:  Well nourished, well developed female in " NAD  Head:Normocephalic, atraumatic  Eyes: PERRLA, EOM within normal limits, no conjunctival injection, no scleral icterus, visual fields and acuity grossly intact.  Ears: Normal shape and symmetry, no tenderness, no discharge. External canals are without any significant edema or erythema. Tympanic membranes are without any inflammation, no effusion. Gross auditory acuity is intact  Nose: Symmetrical without tenderness, no discharge.  Mouth: reasonable hygiene, no erythema exudates or tonsillar enlargement.  Neck: no masses, range of motion within normal limits, no tracheal deviation. No obvious thyroid enlargement.  Pulmonary: chest is symmetrical with respiration, she has diffuse wheezing and rhonchi bilaterally, not clearing with cough   Cardiovascular: regular rate and rhythm without murmurs, rubs, or gallops.  Extremities: no clubbing, cyanosis, or edema.    Please note that this dictation was created using voice recognition software. I have made every reasonable attempt to correct obvious errors, but I expect that there are errors of grammar and possibly content that I did not discover before finalizing the note.    Assessment/Plan:  1. Wheezing-associated respiratory infection (WARI)  albuterol 108 (90 Base) MCG/ACT Aero Soln inhalation aerosol    benzonatate (TESSALON) 200 MG capsule    doxycycline (VIBRAMYCIN) 100 MG Tab   2. Morbid obesity with BMI of 45.0-49.9, adult ([facility])  Patient identified as having weight management issue.  Appropriate orders and counseling given.       Followup with primary care in the next 7-10 days if not significantly improving, return to the urgent care or go to the emergency room sooner for any worsening of symptoms.

## 2018-02-14 NOTE — LETTER
February 14, 2018         Patient: Elizabeth Mendoza   YOB: 1955   Date of Visit: 2/14/2018           To Whom it May Concern:    Elizabeth Mendoza was seen in my clinic on 2/14/2018. She may return to work on 02/17/2018, please excuse any recent absence.    If you have any questions or concerns, please don't hesitate to call.        Sincerely,           Soren Daniels P.A.-C.  Electronically Signed

## 2018-04-11 ENCOUNTER — HOSPITAL ENCOUNTER (OUTPATIENT)
Dept: LAB | Facility: MEDICAL CENTER | Age: 63
End: 2018-04-11
Attending: INTERNAL MEDICINE
Payer: COMMERCIAL

## 2018-04-11 DIAGNOSIS — I10 ESSENTIAL HYPERTENSION: ICD-10-CM

## 2018-04-11 DIAGNOSIS — E78.00 PURE HYPERCHOLESTEROLEMIA: ICD-10-CM

## 2018-04-11 DIAGNOSIS — E03.4 HYPOTHYROIDISM DUE TO ACQUIRED ATROPHY OF THYROID: ICD-10-CM

## 2018-04-11 DIAGNOSIS — E55.9 VITAMIN D DEFICIENCY: ICD-10-CM

## 2018-04-11 LAB
25(OH)D3 SERPL-MCNC: 35 NG/ML (ref 30–100)
ALBUMIN SERPL BCP-MCNC: 4.2 G/DL (ref 3.2–4.9)
ALBUMIN/GLOB SERPL: 1.4 G/DL
ALP SERPL-CCNC: 43 U/L (ref 30–99)
ALT SERPL-CCNC: 20 U/L (ref 2–50)
ANION GAP SERPL CALC-SCNC: 9 MMOL/L (ref 0–11.9)
AST SERPL-CCNC: 19 U/L (ref 12–45)
BASOPHILS # BLD AUTO: 1.1 % (ref 0–1.8)
BASOPHILS # BLD: 0.07 K/UL (ref 0–0.12)
BILIRUB SERPL-MCNC: 0.6 MG/DL (ref 0.1–1.5)
BUN SERPL-MCNC: 19 MG/DL (ref 8–22)
CALCIUM SERPL-MCNC: 9.7 MG/DL (ref 8.5–10.5)
CHLORIDE SERPL-SCNC: 102 MMOL/L (ref 96–112)
CHOLEST SERPL-MCNC: 154 MG/DL (ref 100–199)
CO2 SERPL-SCNC: 29 MMOL/L (ref 20–33)
COMMENT 1642: NORMAL
CREAT SERPL-MCNC: 0.69 MG/DL (ref 0.5–1.4)
EOSINOPHIL # BLD AUTO: 0.3 K/UL (ref 0–0.51)
EOSINOPHIL NFR BLD: 4.7 % (ref 0–6.9)
ERYTHROCYTE [DISTWIDTH] IN BLOOD BY AUTOMATED COUNT: 42 FL (ref 35.9–50)
GLOBULIN SER CALC-MCNC: 3.1 G/DL (ref 1.9–3.5)
GLUCOSE SERPL-MCNC: 88 MG/DL (ref 65–99)
HCT VFR BLD AUTO: 51.2 % (ref 37–47)
HDLC SERPL-MCNC: 47 MG/DL
HGB BLD-MCNC: 16.5 G/DL (ref 12–16)
IMM GRANULOCYTES # BLD AUTO: 0.01 K/UL (ref 0–0.11)
IMM GRANULOCYTES NFR BLD AUTO: 0.2 % (ref 0–0.9)
LDLC SERPL CALC-MCNC: 72 MG/DL
LYMPHOCYTES # BLD AUTO: 1.73 K/UL (ref 1–4.8)
LYMPHOCYTES NFR BLD: 27.2 % (ref 22–41)
MCH RBC QN AUTO: 28.8 PG (ref 27–33)
MCHC RBC AUTO-ENTMCNC: 32.2 G/DL (ref 33.6–35)
MCV RBC AUTO: 89.5 FL (ref 81.4–97.8)
MONOCYTES # BLD AUTO: 0.5 K/UL (ref 0–0.85)
MONOCYTES NFR BLD AUTO: 7.9 % (ref 0–13.4)
MORPHOLOGY BLD-IMP: NORMAL
NEUTROPHILS # BLD AUTO: 3.75 K/UL (ref 2–7.15)
NEUTROPHILS NFR BLD: 58.9 % (ref 44–72)
NRBC # BLD AUTO: 0 K/UL
NRBC BLD-RTO: 0 /100 WBC
PLATELET # BLD AUTO: 247 K/UL (ref 164–446)
PMV BLD AUTO: 11.1 FL (ref 9–12.9)
POTASSIUM SERPL-SCNC: 4 MMOL/L (ref 3.6–5.5)
PROT SERPL-MCNC: 7.3 G/DL (ref 6–8.2)
RBC # BLD AUTO: 5.72 M/UL (ref 4.2–5.4)
SODIUM SERPL-SCNC: 140 MMOL/L (ref 135–145)
TRIGL SERPL-MCNC: 176 MG/DL (ref 0–149)
TSH SERPL DL<=0.005 MIU/L-ACNC: 1.18 UIU/ML (ref 0.38–5.33)
WBC # BLD AUTO: 6.4 K/UL (ref 4.8–10.8)

## 2018-04-11 PROCEDURE — 36415 COLL VENOUS BLD VENIPUNCTURE: CPT

## 2018-04-11 PROCEDURE — 85025 COMPLETE CBC W/AUTO DIFF WBC: CPT

## 2018-04-11 PROCEDURE — 84443 ASSAY THYROID STIM HORMONE: CPT

## 2018-04-11 PROCEDURE — 82306 VITAMIN D 25 HYDROXY: CPT

## 2018-04-11 PROCEDURE — 80061 LIPID PANEL: CPT

## 2018-04-11 PROCEDURE — 80053 COMPREHEN METABOLIC PANEL: CPT

## 2018-04-20 ENCOUNTER — OFFICE VISIT (OUTPATIENT)
Dept: MEDICAL GROUP | Facility: PHYSICIAN GROUP | Age: 63
End: 2018-04-20
Payer: COMMERCIAL

## 2018-04-20 VITALS
BODY MASS INDEX: 43.99 KG/M2 | OXYGEN SATURATION: 95 % | RESPIRATION RATE: 18 BRPM | HEART RATE: 75 BPM | TEMPERATURE: 98.1 F | SYSTOLIC BLOOD PRESSURE: 120 MMHG | WEIGHT: 264 LBS | DIASTOLIC BLOOD PRESSURE: 78 MMHG | HEIGHT: 65 IN

## 2018-04-20 DIAGNOSIS — E03.4 HYPOTHYROIDISM DUE TO ACQUIRED ATROPHY OF THYROID: ICD-10-CM

## 2018-04-20 DIAGNOSIS — I10 ESSENTIAL HYPERTENSION: ICD-10-CM

## 2018-04-20 DIAGNOSIS — J45.20 MILD INTERMITTENT REACTIVE AIRWAY DISEASE WITHOUT COMPLICATION: ICD-10-CM

## 2018-04-20 DIAGNOSIS — E78.5 HYPERLIPIDEMIA, UNSPECIFIED HYPERLIPIDEMIA TYPE: ICD-10-CM

## 2018-04-20 PROBLEM — E66.01 MORBID OBESITY WITH BMI OF 45.0-49.9, ADULT (HCC): Status: RESOLVED | Noted: 2018-02-14 | Resolved: 2018-04-20

## 2018-04-20 PROCEDURE — 99214 OFFICE O/P EST MOD 30 MIN: CPT | Performed by: INTERNAL MEDICINE

## 2018-04-20 RX ORDER — TRIAMTERENE AND HYDROCHLOROTHIAZIDE 37.5; 25 MG/1; MG/1
1 CAPSULE ORAL DAILY
Qty: 90 CAP | Refills: 3 | Status: SHIPPED | OUTPATIENT
Start: 2018-04-20 | End: 2019-03-23 | Stop reason: SDUPTHER

## 2018-04-20 RX ORDER — SIMVASTATIN 20 MG
20 TABLET ORAL EVERY EVENING
Qty: 90 TAB | Refills: 3 | Status: SHIPPED | OUTPATIENT
Start: 2018-04-20 | End: 2019-03-23 | Stop reason: SDUPTHER

## 2018-04-20 RX ORDER — LEVOTHYROXINE SODIUM 0.12 MG/1
125 TABLET ORAL
Qty: 90 TAB | Refills: 3 | Status: SHIPPED | OUTPATIENT
Start: 2018-04-20 | End: 2019-03-25 | Stop reason: SDUPTHER

## 2018-04-20 RX ORDER — ENALAPRIL MALEATE 2.5 MG/1
2.5 TABLET ORAL DAILY
Qty: 90 TAB | Refills: 3 | Status: SHIPPED | OUTPATIENT
Start: 2018-04-20 | End: 2019-03-23 | Stop reason: SDUPTHER

## 2018-04-20 NOTE — ASSESSMENT & PLAN NOTE
30 # weight loss!  Patient started with a weight loss challenge and is now maintain, she is cutting portions.

## 2018-04-20 NOTE — PROGRESS NOTES
Chief Complaint   Patient presents with   • Vitamin D Deficiency     Lab results        HISTORY OF PRESENT ILLNESS: Patient is a 62 y.o. female established patient who presents today to discuss the medical issues below.    Obesity  30 # weight loss!  Patient started with a weight loss challenge and is now maintain, she is cutting portions.      Reactive airway disease without complication  Improved with the weight loss.      Hypothyroid  Patient continues on the meds, had labs.  Weight down as above.      Hypertension  Patient not watching at home, continues on diuretic only.       Patient Active Problem List    Diagnosis Date Noted   • Obesity 03/06/2013     Priority: Medium   • Viral upper respiratory tract infection 01/04/2017   • Anxiety 02/24/2016   • Vitamin D deficiency 02/23/2016   • Reactive airway disease without complication 11/12/2014   • Incisional hernia 03/31/2014   • Hypertension    • Hyperlipidemia    • Hypothyroid        Allergies:Biaxin [clarithromycin]; Cipro xr; and Pain relief    Current Outpatient Prescriptions   Medication Sig Dispense Refill   • levothyroxine (SYNTHROID) 125 MCG Tab Take 1 Tab by mouth every morning before breakfast. 90 Tab 3   • triamterene/hctz (MAXZIDE-25/DYAZIDE) 37.5-25 MG Cap Take 1 Cap by mouth every day. 90 Cap 3   • enalapril (VASOTEC) 2.5 MG Tab Take 1 Tab by mouth every day. 90 Tab 3   • simvastatin (ZOCOR) 20 MG Tab Take 1 Tab by mouth every evening. 90 Tab 3   • albuterol 108 (90 Base) MCG/ACT Aero Soln inhalation aerosol Inhale 2 Puffs by mouth every four hours as needed. 1 Inhaler 0   • Famotidine (PEPCID PO) Take  by mouth.     • cholecalciferol (VITAMIN D3) 5000 UNIT Cap Take 1 Cap by mouth every day. 90 Cap 3   • albuterol (VENTOLIN OR PROVENTIL) 108 (90 BASE) MCG/ACT Aero Soln inhalation aerosol Inhale 2 Puffs by mouth every 6 hours as needed for Shortness of Breath. 8.5 g 1   • aspirin (ASA) 325 MG TABS Take 325 mg by mouth every 6 hours as needed.    "  • omeprazole (PRILOSEC) 20 MG CPDR Take 20 mg by mouth every day.     • benzonatate (TESSALON) 200 MG capsule Take 1 Cap by mouth 3 times a day as needed for Cough. 60 Cap 0   • alprazolam (XANAX) 0.5 MG Tab Take 1 Tab by mouth at bedtime as needed. 30 Tab 0   • fluticasone (FLOVENT HFA) 110 MCG/ACT Aerosol Inhale 2 Puffs by mouth 2 times a day. 1 Inhaler 3   • Calcium Polycarbophil (FIBERCON PO) Take  by mouth 2 Times a Day. Takes two       No current facility-administered medications for this visit.          Past Medical History:   Diagnosis Date   • Anxiety 2016   • Bronchitis    • Cholesterol blood decreased    • Cough 2014   • Diverticulitis    • Heart burn    • Hyperlipidemia    • Hypertension    • Hypothyroid    • Other specified disorder of intestines    • Viral upper respiratory tract infection 2017   • Vitamin D deficiency 2016       Social History   Substance Use Topics   • Smoking status: Never Smoker   • Smokeless tobacco: Never Used   • Alcohol use No       Family Status   Relation Status   • Mother Alive   • Father    •       Family History   Problem Relation Age of Onset   • Stroke Mother    • Hypertension Mother    • Heart Disease Father 55   • Diabetes         ROS:    Respiratory: Negative for cough, sputum production, shortness of breath or wheezing.    Cardiovascular: Negative for chest pain, palpitations, orthopnea, dyspnea with exertion or edema.   Gastrointestinal: Negative for GI upset, nausea, vomiting, abdominal pain, constipation or diarrhea.   Genitourinary: Negative for dysuria, urgency, hesitancy or frequency.       Exam:    Blood pressure 120/78, pulse 75, temperature 36.7 °C (98.1 °F), resp. rate 18, height 1.651 m (5' 5\"), weight 119.7 kg (264 lb), SpO2 95 %.  General:  Well nourished, well developed female in NAD.  HENT: Normocephalic, bilateral TMs are intact, nasal and oral mucosa with no lesions,   Neck: Supple without bruit. Thyroid is not " enlarged.  Pulmonary: Clear to ausculation and percussion.  Normal effort. No rales, rhonchi, or wheezing.  Cardiovascular: Regular rate and rhythm without murmur.   Abdomen: Normal bowel sounds soft and nontender no palpable liver spleen bladder mass.  Extremities: No LE edema noted.  Neuro: Grossly nonfocal.  Psych: Alert and oriented to person, place, and time. Appropriate mood and conversation.    LABS: Results reviewed and discussed with the patient, questions answered.      This dictation was created using voice recognition software. I have made reasonable attempts to correct errors, however, errors of grammar and content may exist.          Assessment/Plan:    1. Class 3 obesity due to excess calories without serious comorbidity with body mass index (BMI) of 40.0 to 44.9 in adult (CMS-[facility])  Continue to support review diet exercise weight loss. Discussed high protein low carbohydrate diet.    2. Mild intermittent reactive airway disease without complication  Clear breath sounds she is only utilizing rescue inhaler on a when necessary basis.    3. Hypothyroidism due to acquired atrophy of thyroid  Clinically and lab euthyroid prescription written    4. Essential hypertension  Blood pressure stable she's cut her Maxzide to one a day continue to monitor one a day and trial discontinuation with blood pressure and edema monitoring with ongoing weight loss.  - CBC WITH DIFFERENTIAL; Future  - COMP METABOLIC PANEL; Future  - triamterene/hctz (MAXZIDE-25/DYAZIDE) 37.5-25 MG Cap; Take 1 Cap by mouth every day.  Dispense: 90 Cap; Refill: 3    5. Hyperlipidemia, unspecified hyperlipidemia type  Well-controlled with the ongoing simvastatin triglycerides remain elevated monitor for now with ongoing weight loss may be able to discontinue or decrease medications discussed with patient.  - LIPID PROFILE; Future    Patient was seen for  25 minutes face to face of which more than 50% of the time was spent in counseling and  coordination of care regarding the above problems.

## 2018-12-28 ENCOUNTER — HOSPITAL ENCOUNTER (OUTPATIENT)
Dept: LAB | Facility: MEDICAL CENTER | Age: 63
End: 2018-12-28
Attending: INTERNAL MEDICINE
Payer: COMMERCIAL

## 2018-12-28 DIAGNOSIS — I10 ESSENTIAL HYPERTENSION: ICD-10-CM

## 2018-12-28 DIAGNOSIS — E78.5 HYPERLIPIDEMIA, UNSPECIFIED HYPERLIPIDEMIA TYPE: ICD-10-CM

## 2018-12-28 LAB
ALBUMIN SERPL BCP-MCNC: 4.2 G/DL (ref 3.2–4.9)
ALBUMIN/GLOB SERPL: 1.4 G/DL
ALP SERPL-CCNC: 48 U/L (ref 30–99)
ALT SERPL-CCNC: 21 U/L (ref 2–50)
ANION GAP SERPL CALC-SCNC: 10 MMOL/L (ref 0–11.9)
AST SERPL-CCNC: 17 U/L (ref 12–45)
BASOPHILS # BLD AUTO: 1.1 % (ref 0–1.8)
BASOPHILS # BLD: 0.08 K/UL (ref 0–0.12)
BILIRUB SERPL-MCNC: 0.6 MG/DL (ref 0.1–1.5)
BUN SERPL-MCNC: 18 MG/DL (ref 8–22)
CALCIUM SERPL-MCNC: 9.8 MG/DL (ref 8.5–10.5)
CHLORIDE SERPL-SCNC: 103 MMOL/L (ref 96–112)
CHOLEST SERPL-MCNC: 182 MG/DL (ref 100–199)
CO2 SERPL-SCNC: 29 MMOL/L (ref 20–33)
CREAT SERPL-MCNC: 0.89 MG/DL (ref 0.5–1.4)
EOSINOPHIL # BLD AUTO: 0.35 K/UL (ref 0–0.51)
EOSINOPHIL NFR BLD: 5 % (ref 0–6.9)
ERYTHROCYTE [DISTWIDTH] IN BLOOD BY AUTOMATED COUNT: 43.6 FL (ref 35.9–50)
FASTING STATUS PATIENT QL REPORTED: NORMAL
GLOBULIN SER CALC-MCNC: 3 G/DL (ref 1.9–3.5)
GLUCOSE SERPL-MCNC: 105 MG/DL (ref 65–99)
HCT VFR BLD AUTO: 49.1 % (ref 37–47)
HDLC SERPL-MCNC: 49 MG/DL
HGB BLD-MCNC: 15.3 G/DL (ref 12–16)
IMM GRANULOCYTES # BLD AUTO: 0.01 K/UL (ref 0–0.11)
IMM GRANULOCYTES NFR BLD AUTO: 0.1 % (ref 0–0.9)
LDLC SERPL CALC-MCNC: 105 MG/DL
LYMPHOCYTES # BLD AUTO: 2.11 K/UL (ref 1–4.8)
LYMPHOCYTES NFR BLD: 29.8 % (ref 22–41)
MCH RBC QN AUTO: 27.9 PG (ref 27–33)
MCHC RBC AUTO-ENTMCNC: 31.2 G/DL (ref 33.6–35)
MCV RBC AUTO: 89.6 FL (ref 81.4–97.8)
MONOCYTES # BLD AUTO: 0.63 K/UL (ref 0–0.85)
MONOCYTES NFR BLD AUTO: 8.9 % (ref 0–13.4)
NEUTROPHILS # BLD AUTO: 3.89 K/UL (ref 2–7.15)
NEUTROPHILS NFR BLD: 55.1 % (ref 44–72)
NRBC # BLD AUTO: 0 K/UL
NRBC BLD-RTO: 0 /100 WBC
PLATELET # BLD AUTO: 281 K/UL (ref 164–446)
PMV BLD AUTO: 10 FL (ref 9–12.9)
POTASSIUM SERPL-SCNC: 3.9 MMOL/L (ref 3.6–5.5)
PROT SERPL-MCNC: 7.2 G/DL (ref 6–8.2)
RBC # BLD AUTO: 5.48 M/UL (ref 4.2–5.4)
SODIUM SERPL-SCNC: 142 MMOL/L (ref 135–145)
TRIGL SERPL-MCNC: 139 MG/DL (ref 0–149)
WBC # BLD AUTO: 7.1 K/UL (ref 4.8–10.8)

## 2018-12-28 PROCEDURE — 80061 LIPID PANEL: CPT

## 2018-12-28 PROCEDURE — 80053 COMPREHEN METABOLIC PANEL: CPT

## 2018-12-28 PROCEDURE — 36415 COLL VENOUS BLD VENIPUNCTURE: CPT

## 2018-12-28 PROCEDURE — 85025 COMPLETE CBC W/AUTO DIFF WBC: CPT

## 2019-01-04 ENCOUNTER — OFFICE VISIT (OUTPATIENT)
Dept: MEDICAL GROUP | Facility: PHYSICIAN GROUP | Age: 64
End: 2019-01-04
Payer: COMMERCIAL

## 2019-01-04 VITALS
TEMPERATURE: 98.4 F | DIASTOLIC BLOOD PRESSURE: 80 MMHG | WEIGHT: 290 LBS | HEIGHT: 65 IN | OXYGEN SATURATION: 96 % | BODY MASS INDEX: 48.32 KG/M2 | RESPIRATION RATE: 20 BRPM | SYSTOLIC BLOOD PRESSURE: 116 MMHG | HEART RATE: 96 BPM

## 2019-01-04 DIAGNOSIS — E03.4 HYPOTHYROIDISM DUE TO ACQUIRED ATROPHY OF THYROID: ICD-10-CM

## 2019-01-04 DIAGNOSIS — E78.00 PURE HYPERCHOLESTEROLEMIA: ICD-10-CM

## 2019-01-04 DIAGNOSIS — I10 ESSENTIAL HYPERTENSION: ICD-10-CM

## 2019-01-04 DIAGNOSIS — F41.9 ANXIETY: ICD-10-CM

## 2019-01-04 DIAGNOSIS — E66.01 CLASS 3 SEVERE OBESITY DUE TO EXCESS CALORIES WITHOUT SERIOUS COMORBIDITY WITH BODY MASS INDEX (BMI) OF 40.0 TO 44.9 IN ADULT (HCC): ICD-10-CM

## 2019-01-04 PROCEDURE — 99214 OFFICE O/P EST MOD 30 MIN: CPT | Performed by: INTERNAL MEDICINE

## 2019-01-04 ASSESSMENT — PATIENT HEALTH QUESTIONNAIRE - PHQ9: CLINICAL INTERPRETATION OF PHQ2 SCORE: 0

## 2019-01-04 NOTE — PROGRESS NOTES
Chief Complaint   Patient presents with   • Hypertension     lab results       HISTORY OF PRESENT ILLNESS: Patient is a 63 y.o. female established patient who presents today to discuss the medical issues below.    Hypothyroid  Patient continues on the thyroid, denies problems with constipation diarrhea.  No skin changes.  Last lab assessment in April 2017 was euthyroid.    Obesity  She is dyspneic with exertion some times.  Weight is back up.    Anxiety  Patient reports only rare use of the xanax. She is not requesting a refill.    Hypertension  Patient continues on medications not exercising weight is back up.    Hyperlipidemia  Patient continues on simvastatin had lab work done.      Patient Active Problem List    Diagnosis Date Noted   • Obesity 03/06/2013     Priority: Medium   • Viral upper respiratory tract infection 01/04/2017   • Anxiety 02/24/2016   • Vitamin D deficiency 02/23/2016   • Reactive airway disease without complication 11/12/2014   • Incisional hernia 03/31/2014   • Hypertension    • Hyperlipidemia    • Hypothyroid        Allergies:Biaxin [clarithromycin]; Cipro xr; and Pain relief    Current Outpatient Prescriptions   Medication Sig Dispense Refill   • levothyroxine (SYNTHROID) 125 MCG Tab Take 1 Tab by mouth every morning before breakfast. 90 Tab 3   • triamterene/hctz (MAXZIDE-25/DYAZIDE) 37.5-25 MG Cap Take 1 Cap by mouth every day. 90 Cap 3   • enalapril (VASOTEC) 2.5 MG Tab Take 1 Tab by mouth every day. 90 Tab 3   • simvastatin (ZOCOR) 20 MG Tab Take 1 Tab by mouth every evening. 90 Tab 3   • cholecalciferol (VITAMIN D3) 5000 UNIT Cap Take 1 Cap by mouth every day. 90 Cap 3   • aspirin (ASA) 325 MG TABS Take 325 mg by mouth every 6 hours as needed.     • omeprazole (PRILOSEC) 20 MG CPDR Take 20 mg by mouth every day.     • albuterol 108 (90 Base) MCG/ACT Aero Soln inhalation aerosol Inhale 2 Puffs by mouth every four hours as needed. 1 Inhaler 0   • benzonatate (TESSALON) 200 MG capsule  "Take 1 Cap by mouth 3 times a day as needed for Cough. (Patient not taking: Reported on 2019) 60 Cap 0   • alprazolam (XANAX) 0.5 MG Tab Take 1 Tab by mouth at bedtime as needed. 30 Tab 0   • Famotidine (PEPCID PO) Take  by mouth.     • fluticasone (FLOVENT HFA) 110 MCG/ACT Aerosol Inhale 2 Puffs by mouth 2 times a day. 1 Inhaler 3   • albuterol (VENTOLIN OR PROVENTIL) 108 (90 BASE) MCG/ACT Aero Soln inhalation aerosol Inhale 2 Puffs by mouth every 6 hours as needed for Shortness of Breath. 8.5 g 1   • Calcium Polycarbophil (FIBERCON PO) Take  by mouth 2 Times a Day. Takes two       No current facility-administered medications for this visit.          Past Medical History:   Diagnosis Date   • Anxiety 2016   • Bronchitis    • Cholesterol blood decreased    • Cough 2014   • Diverticulitis    • Heart burn    • Hyperlipidemia    • Hypertension    • Hypothyroid    • Other specified disorder of intestines    • Viral upper respiratory tract infection 2017   • Vitamin D deficiency 2016       Social History   Substance Use Topics   • Smoking status: Never Smoker   • Smokeless tobacco: Never Used   • Alcohol use No       Family Status   Relation Status   • Mo Alive   • Fa    • Unknown (Not Specified)     Family History   Problem Relation Age of Onset   • Stroke Mother    • Hypertension Mother    • Heart Disease Father 55   • Diabetes Unknown        ROS:    Respiratory: Negative for cough, sputum production, shortness of breath or wheezing.    Cardiovascular: Negative for chest pain, palpitations, orthopnea, dyspnea with exertion or edema.   Gastrointestinal: Negative for GI upset, nausea, vomiting, abdominal pain, constipation or diarrhea.   Genitourinary: Negative for dysuria, urgency, hesitancy or frequency.       Exam:    Blood pressure 116/80, pulse 96, temperature 36.9 °C (98.4 °F), temperature source Temporal, resp. rate 20, height 1.651 m (5' 5\"), weight (!) 131.5 kg (290 lb), SpO2 96 " %, not currently breastfeeding.  General:  Well nourished, well developed female in NAD.  Pulmonary: Clear to ausculation and percussion.  Normal effort. No rales, rhonchi, or wheezing.  Cardiovascular: Regular rate and rhythm without murmur.   Abdomen: Normal bowel sounds soft and nontender no palpable liver spleen bladder mass.  Extremities: No LE edema noted.  Neuro: Grossly nonfocal.  Psych: Alert and oriented to person, place, and time. Appropriate mood and conversation.    LABS: Results reviewed and discussed with the patient, questions answered.      This dictation was created using voice recognition software. I have made reasonable attempts to correct errors, however, errors of grammar and content may exist.          Assessment/Plan:    1. Hypothyroidism due to acquired atrophy of thyroid  Clinically euthyroid continues on replacement would add for annual thyroid blood work  - TSH WITH REFLEX TO FT4; Future    2. Class 3 severe obesity due to excess calories without serious comorbidity with body mass index (BMI) of 40.0 to 44.9 in adult (HCC)  Discussed diet, exercise, weight loss, behavioral modification, portion size management.  Overall prognosis marginal offered bariatric referral she declines    3. Anxiety  Patient attributes most of her anxiety and stress to her workplace she plans to work for another year and a half.  Support monitor no refill on Xanax given.    4. Essential hypertension  Controlled on medications triamterene hydrochlorothiazide with enalapril 2.5 mg a day.  Reviewed refills  - COMP METABOLIC PANEL; Future  - CBC WITH DIFFERENTIAL; Future    5. Pure hypercholesterolemia  Borderline LDL elevation stable with the ongoing simvastatin.     Patient was seen for 25 minutes face to face of which more than 50% of the time was spent in counseling and coordination of care regarding the above problems.

## 2019-01-04 NOTE — ASSESSMENT & PLAN NOTE
Patient continues on the thyroid, denies problems with constipation diarrhea.  No skin changes.  Last lab assessment in April 2017 was euthyroid.

## 2019-03-23 DIAGNOSIS — I10 ESSENTIAL HYPERTENSION: ICD-10-CM

## 2019-03-25 NOTE — TELEPHONE ENCOUNTER
Was the patient seen in the last year in this department? Yes    Does patient have an active prescription for medications requested? No     Received Request Via: Pharmacy     Last OV 1/4/19, last labs 12/28/18

## 2019-03-26 RX ORDER — TRIAMTERENE AND HYDROCHLOROTHIAZIDE 37.5; 25 MG/1; MG/1
CAPSULE ORAL
Qty: 90 CAP | Refills: 0 | Status: SHIPPED | OUTPATIENT
Start: 2019-03-26 | End: 2019-05-24 | Stop reason: SDUPTHER

## 2019-03-26 RX ORDER — SIMVASTATIN 20 MG
TABLET ORAL
Qty: 90 TAB | Refills: 0 | Status: SHIPPED | OUTPATIENT
Start: 2019-03-26 | End: 2019-05-24 | Stop reason: SDUPTHER

## 2019-03-26 RX ORDER — LEVOTHYROXINE SODIUM 0.12 MG/1
TABLET ORAL
Qty: 90 TAB | Refills: 0 | Status: SHIPPED | OUTPATIENT
Start: 2019-03-26 | End: 2019-05-24 | Stop reason: SDUPTHER

## 2019-03-26 RX ORDER — ENALAPRIL MALEATE 2.5 MG/1
TABLET ORAL
Qty: 90 TAB | Refills: 0 | Status: SHIPPED | OUTPATIENT
Start: 2019-03-26 | End: 2019-05-24 | Stop reason: SDUPTHER

## 2019-05-24 DIAGNOSIS — I10 ESSENTIAL HYPERTENSION: ICD-10-CM

## 2019-05-24 NOTE — TELEPHONE ENCOUNTER
Was the patient seen in the last year in this department? Yes    Does patient have an active prescription for medications requested? No     Received Request Via: Patient     Last OV 1/4/19, last labs 12/28/18    Pt is requesting 90 day supply sent to mail order before her insurance changes the first of July.

## 2019-05-29 RX ORDER — ENALAPRIL MALEATE 2.5 MG/1
2.5 TABLET ORAL
Qty: 90 TAB | Refills: 3 | Status: SHIPPED | OUTPATIENT
Start: 2019-05-29 | End: 2019-07-17 | Stop reason: SDUPTHER

## 2019-05-29 RX ORDER — LEVOTHYROXINE SODIUM 0.12 MG/1
125 TABLET ORAL
Qty: 90 TAB | Refills: 3 | Status: SHIPPED | OUTPATIENT
Start: 2019-05-29 | End: 2019-07-17 | Stop reason: SDUPTHER

## 2019-05-29 RX ORDER — SIMVASTATIN 20 MG
20 TABLET ORAL EVERY EVENING
Qty: 90 TAB | Refills: 3 | Status: SHIPPED | OUTPATIENT
Start: 2019-05-29 | End: 2019-07-17 | Stop reason: SDUPTHER

## 2019-05-29 RX ORDER — TRIAMTERENE AND HYDROCHLOROTHIAZIDE 37.5; 25 MG/1; MG/1
1 CAPSULE ORAL
Qty: 90 CAP | Refills: 3 | Status: SHIPPED | OUTPATIENT
Start: 2019-05-29 | End: 2019-07-17 | Stop reason: SDUPTHER

## 2019-07-10 ENCOUNTER — HOSPITAL ENCOUNTER (OUTPATIENT)
Dept: LAB | Facility: MEDICAL CENTER | Age: 64
End: 2019-07-10
Attending: INTERNAL MEDICINE
Payer: COMMERCIAL

## 2019-07-10 DIAGNOSIS — I10 ESSENTIAL HYPERTENSION: ICD-10-CM

## 2019-07-10 DIAGNOSIS — E03.4 HYPOTHYROIDISM DUE TO ACQUIRED ATROPHY OF THYROID: ICD-10-CM

## 2019-07-10 LAB
ALBUMIN SERPL BCP-MCNC: 4.3 G/DL (ref 3.2–4.9)
ALBUMIN/GLOB SERPL: 1.3 G/DL
ALP SERPL-CCNC: 45 U/L (ref 30–99)
ALT SERPL-CCNC: 20 U/L (ref 2–50)
ANION GAP SERPL CALC-SCNC: 11 MMOL/L (ref 0–11.9)
AST SERPL-CCNC: 18 U/L (ref 12–45)
BASOPHILS # BLD AUTO: 0.9 % (ref 0–1.8)
BASOPHILS # BLD: 0.07 K/UL (ref 0–0.12)
BILIRUB SERPL-MCNC: 0.5 MG/DL (ref 0.1–1.5)
BUN SERPL-MCNC: 18 MG/DL (ref 8–22)
CALCIUM SERPL-MCNC: 10.3 MG/DL (ref 8.5–10.5)
CHLORIDE SERPL-SCNC: 102 MMOL/L (ref 96–112)
CO2 SERPL-SCNC: 28 MMOL/L (ref 20–33)
CREAT SERPL-MCNC: 0.83 MG/DL (ref 0.5–1.4)
EOSINOPHIL # BLD AUTO: 0.28 K/UL (ref 0–0.51)
EOSINOPHIL NFR BLD: 3.7 % (ref 0–6.9)
ERYTHROCYTE [DISTWIDTH] IN BLOOD BY AUTOMATED COUNT: 45.7 FL (ref 35.9–50)
GLOBULIN SER CALC-MCNC: 3.2 G/DL (ref 1.9–3.5)
GLUCOSE SERPL-MCNC: 108 MG/DL (ref 65–99)
HCT VFR BLD AUTO: 50.6 % (ref 37–47)
HGB BLD-MCNC: 16.2 G/DL (ref 12–16)
IMM GRANULOCYTES # BLD AUTO: 0.02 K/UL (ref 0–0.11)
IMM GRANULOCYTES NFR BLD AUTO: 0.3 % (ref 0–0.9)
LYMPHOCYTES # BLD AUTO: 2 K/UL (ref 1–4.8)
LYMPHOCYTES NFR BLD: 26.6 % (ref 22–41)
MCH RBC QN AUTO: 29.2 PG (ref 27–33)
MCHC RBC AUTO-ENTMCNC: 32 G/DL (ref 33.6–35)
MCV RBC AUTO: 91.3 FL (ref 81.4–97.8)
MONOCYTES # BLD AUTO: 0.71 K/UL (ref 0–0.85)
MONOCYTES NFR BLD AUTO: 9.4 % (ref 0–13.4)
NEUTROPHILS # BLD AUTO: 4.45 K/UL (ref 2–7.15)
NEUTROPHILS NFR BLD: 59.1 % (ref 44–72)
NRBC # BLD AUTO: 0 K/UL
NRBC BLD-RTO: 0 /100 WBC
PLATELET # BLD AUTO: 262 K/UL (ref 164–446)
PMV BLD AUTO: 10.9 FL (ref 9–12.9)
POTASSIUM SERPL-SCNC: 3.7 MMOL/L (ref 3.6–5.5)
PROT SERPL-MCNC: 7.5 G/DL (ref 6–8.2)
RBC # BLD AUTO: 5.54 M/UL (ref 4.2–5.4)
SODIUM SERPL-SCNC: 141 MMOL/L (ref 135–145)
T4 FREE SERPL-MCNC: 0.83 NG/DL (ref 0.53–1.43)
TSH SERPL DL<=0.005 MIU/L-ACNC: 6.98 UIU/ML (ref 0.38–5.33)
WBC # BLD AUTO: 7.5 K/UL (ref 4.8–10.8)

## 2019-07-10 PROCEDURE — 80053 COMPREHEN METABOLIC PANEL: CPT

## 2019-07-10 PROCEDURE — 84439 ASSAY OF FREE THYROXINE: CPT

## 2019-07-10 PROCEDURE — 85025 COMPLETE CBC W/AUTO DIFF WBC: CPT

## 2019-07-10 PROCEDURE — 36415 COLL VENOUS BLD VENIPUNCTURE: CPT

## 2019-07-10 PROCEDURE — 84443 ASSAY THYROID STIM HORMONE: CPT

## 2019-07-17 ENCOUNTER — OFFICE VISIT (OUTPATIENT)
Dept: MEDICAL GROUP | Facility: PHYSICIAN GROUP | Age: 64
End: 2019-07-17
Payer: COMMERCIAL

## 2019-07-17 VITALS
OXYGEN SATURATION: 94 % | DIASTOLIC BLOOD PRESSURE: 98 MMHG | TEMPERATURE: 98.1 F | SYSTOLIC BLOOD PRESSURE: 140 MMHG | HEART RATE: 81 BPM | WEIGHT: 293 LBS | RESPIRATION RATE: 18 BRPM | HEIGHT: 65 IN | BODY MASS INDEX: 48.82 KG/M2

## 2019-07-17 DIAGNOSIS — I10 ESSENTIAL HYPERTENSION: ICD-10-CM

## 2019-07-17 DIAGNOSIS — F41.9 ANXIETY: ICD-10-CM

## 2019-07-17 DIAGNOSIS — E03.4 HYPOTHYROIDISM DUE TO ACQUIRED ATROPHY OF THYROID: ICD-10-CM

## 2019-07-17 DIAGNOSIS — E78.00 PURE HYPERCHOLESTEROLEMIA: ICD-10-CM

## 2019-07-17 PROCEDURE — 99214 OFFICE O/P EST MOD 30 MIN: CPT | Performed by: INTERNAL MEDICINE

## 2019-07-17 RX ORDER — SIMVASTATIN 20 MG
20 TABLET ORAL EVERY EVENING
Qty: 90 TAB | Refills: 3 | Status: SHIPPED | OUTPATIENT
Start: 2019-07-17 | End: 2020-09-02 | Stop reason: SDUPTHER

## 2019-07-17 RX ORDER — LEVOTHYROXINE SODIUM 0.12 MG/1
125 TABLET ORAL
Qty: 90 TAB | Refills: 3 | Status: SHIPPED | OUTPATIENT
Start: 2019-07-17 | End: 2020-09-02 | Stop reason: SDUPTHER

## 2019-07-17 RX ORDER — ENALAPRIL MALEATE 2.5 MG/1
2.5 TABLET ORAL
Qty: 90 TAB | Refills: 3 | Status: SHIPPED | OUTPATIENT
Start: 2019-07-17 | End: 2019-10-28

## 2019-07-17 RX ORDER — TRIAMTERENE AND HYDROCHLOROTHIAZIDE 37.5; 25 MG/1; MG/1
1 CAPSULE ORAL
Qty: 90 CAP | Refills: 3 | Status: SHIPPED
Start: 2019-07-17 | End: 2020-07-06

## 2019-07-17 NOTE — TELEPHONE ENCOUNTER
Was the patient seen in the last year in this department? Yes    Does patient have an active prescription for medications requested? No     Received Request Via: Pharmacy     Last OV 7/17/19  Labs 7/10/19    Pt is switching pharmacies to [facility]

## 2019-07-17 NOTE — ASSESSMENT & PLAN NOTE
Patient reports she has been under a great deal of her stress,  has been in the hospital.  She does not request Xanax however.

## 2019-07-17 NOTE — PROGRESS NOTES
Chief Complaint   Patient presents with   • Hypertension     lab results    • Medication Refill     xanax        HISTORY OF PRESENT ILLNESS: Patient is a 64 y.o. female established patient who presents today to discuss the medical issues below.    Anxiety  Patient reports she has been under a great deal of her stress,  has been in the hospital.  She does not request Xanax however.    Hyperlipidemia  Patient did have lab work done she continues to struggle with diet exercise and weight.    Hypertension  Patient is not following at home.  She admits that she sometimes skips the diuretic when she is going to be traveling or has office visit.  She does continue with edema on the days she misses her Dyazide.    Hypothyroid  Patient reports she is compliant with the levothyroxine 125 mcg daily.  Denies constipation diarrhea.      Patient Active Problem List    Diagnosis Date Noted   • Obesity 03/06/2013     Priority: Medium   • Viral upper respiratory tract infection 01/04/2017   • Anxiety 02/24/2016   • Vitamin D deficiency 02/23/2016   • Reactive airway disease without complication 11/12/2014   • Incisional hernia 03/31/2014   • Hypertension    • Hyperlipidemia    • Hypothyroid        Allergies:Biaxin [clarithromycin]; Cipro xr; and Pain relief    Current Outpatient Prescriptions   Medication Sig Dispense Refill   • triamterene/hctz (MAXZIDE-25/DYAZIDE) 37.5-25 MG Cap Take 1 Cap by mouth every day. 90 Cap 3   • simvastatin (ZOCOR) 20 MG Tab Take 1 Tab by mouth every evening. 90 Tab 3   • enalapril (VASOTEC) 2.5 MG Tab Take 1 Tab by mouth every day. 90 Tab 3   • levothyroxine (SYNTHROID) 125 MCG Tab Take 1 Tab by mouth every morning before breakfast. 90 Tab 3   • Famotidine (PEPCID PO) Take  by mouth.     • cholecalciferol (VITAMIN D3) 5000 UNIT Cap Take 1 Cap by mouth every day. 90 Cap 3   • aspirin (ASA) 325 MG TABS Take 325 mg by mouth every 6 hours as needed.     • omeprazole (PRILOSEC) 20 MG CPDR Take 20 mg  by mouth every day.     • albuterol 108 (90 Base) MCG/ACT Aero Soln inhalation aerosol Inhale 2 Puffs by mouth every four hours as needed. 1 Inhaler 0   • benzonatate (TESSALON) 200 MG capsule Take 1 Cap by mouth 3 times a day as needed for Cough. (Patient not taking: Reported on 2019) 60 Cap 0   • alprazolam (XANAX) 0.5 MG Tab Take 1 Tab by mouth at bedtime as needed. 30 Tab 0   • fluticasone (FLOVENT HFA) 110 MCG/ACT Aerosol Inhale 2 Puffs by mouth 2 times a day. (Patient not taking: Reported on 2019) 1 Inhaler 3   • albuterol (VENTOLIN OR PROVENTIL) 108 (90 BASE) MCG/ACT Aero Soln inhalation aerosol Inhale 2 Puffs by mouth every 6 hours as needed for Shortness of Breath. 8.5 g 1   • Calcium Polycarbophil (FIBERCON PO) Take  by mouth 2 Times a Day. Takes two       No current facility-administered medications for this visit.          Past Medical History:   Diagnosis Date   • Anxiety 2016   • Bronchitis    • Cholesterol blood decreased    • Cough 2014   • Diverticulitis    • Heart burn    • Hyperlipidemia    • Hypertension    • Hypothyroid    • Other specified disorder of intestines    • Viral upper respiratory tract infection 2017   • Vitamin D deficiency 2016       Social History   Substance Use Topics   • Smoking status: Never Smoker   • Smokeless tobacco: Never Used   • Alcohol use No       Family Status   Relation Status   • Mo Alive   • Fa    • Unknown (Not Specified)     Family History   Problem Relation Age of Onset   • Stroke Mother    • Hypertension Mother    • Heart Disease Father 55   • Diabetes Unknown        ROS:    Respiratory: Negative for cough, sputum production, shortness of breath or wheezing.    Cardiovascular: Negative for chest pain, palpitations, orthopnea, dyspnea with exertion or edema.   Gastrointestinal: Negative for GI upset, nausea, vomiting, abdominal pain, constipation or diarrhea.   Genitourinary: Negative for dysuria, urgency, hesitancy or  "frequency.       Exam:    /98 (BP Location: Left arm, Patient Position: Sitting, BP Cuff Size: Large adult)   Pulse 81   Temp 36.7 °C (98.1 °F) (Temporal)   Resp 18   Ht 1.651 m (5' 5\")   Wt (!) 138.3 kg (305 lb)   SpO2 94%   General:  Well nourished, well developed female in NAD.  Pulmonary: Clear to ausculation and percussion.  Normal effort. No rales, rhonchi, or wheezing.  Cardiovascular: Regular rate and rhythm without murmur.   Abdomen: Normal bowel sounds soft and nontender no palpable liver spleen bladder mass.  Extremities: Trace edema bilaterally  Neuro: Grossly nonfocal.  Psych: Alert and oriented to person, place, and time. Appropriate mood and conversation.        This dictation was created using voice recognition software. I have made reasonable attempts to correct errors, however, errors of grammar and content may exist.          Assessment/Plan:    1. Anxiety  Discussed options for counseling stress management.  She declines counseling monitor support.      2. Pure hypercholesterolemia  Implications of the elevated LDL cholesterol discussed she continues to decline change in medications.  Continues on the simvastatin 20 mg a day.    3. Essential hypertension  Blood pressure is elevated she declines medication changes for now she wants to work on diet exercise weight loss.  Suggested increasing the ACE inhibitor pending weight loss as this is been marginally successful in the past.  She declines.  States understanding agrees to coming in for blood pressure checks periodically or sending me report by my chart.    4. Hypothyroidism due to acquired atrophy of thyroid  TSH only slightly elevated monitor with no medication change for now.    Patient was seen for 25 minutes face to face of which more than 50% of the time was spent in counseling and coordination of care regarding the above problems.         "

## 2019-07-17 NOTE — ASSESSMENT & PLAN NOTE
Patient reports she is compliant with the levothyroxine 125 mcg daily.  Denies constipation diarrhea.

## 2019-07-17 NOTE — ASSESSMENT & PLAN NOTE
Patient is not following at home.  She admits that she sometimes skips the diuretic when she is going to be traveling or has office visit.  She does continue with edema on the days she misses her Dyazide.

## 2019-07-23 ENCOUNTER — PATIENT MESSAGE (OUTPATIENT)
Dept: MEDICAL GROUP | Facility: PHYSICIAN GROUP | Age: 64
End: 2019-07-23

## 2019-07-23 NOTE — TELEPHONE ENCOUNTER
From: Elizabeth Mendoza  To: Serenity HOUSE M.D.  Sent: 7/23/2019 11:29 AM PDT  Subject: Procedure Question    Dr. Enriquez, I've been taking my B/P pretty much daily since my appt. Here they are: 110/89, 124/72, 144/81/ 124/95, 169/83/ 147/79 and 125/70. I am still taking my Enalapril 2.5 mg daily. And being way smarter at what I am eating. I will continue with the above as long as it is good with you!

## 2019-10-16 ENCOUNTER — OFFICE VISIT (OUTPATIENT)
Dept: URGENT CARE | Facility: PHYSICIAN GROUP | Age: 64
End: 2019-10-16
Payer: COMMERCIAL

## 2019-10-16 VITALS
RESPIRATION RATE: 18 BRPM | HEIGHT: 65 IN | BODY MASS INDEX: 48.82 KG/M2 | DIASTOLIC BLOOD PRESSURE: 72 MMHG | OXYGEN SATURATION: 94 % | SYSTOLIC BLOOD PRESSURE: 130 MMHG | HEART RATE: 100 BPM | WEIGHT: 293 LBS | TEMPERATURE: 97.8 F

## 2019-10-16 DIAGNOSIS — J98.8 WHEEZING-ASSOCIATED RESPIRATORY INFECTION (WARI): ICD-10-CM

## 2019-10-16 PROCEDURE — 99214 OFFICE O/P EST MOD 30 MIN: CPT | Performed by: PHYSICIAN ASSISTANT

## 2019-10-16 RX ORDER — METHYLPREDNISOLONE 4 MG/1
4 TABLET ORAL SEE ADMIN INSTRUCTIONS
Qty: 21 TAB | Refills: 0 | Status: SHIPPED | OUTPATIENT
Start: 2019-10-16 | End: 2019-10-28

## 2019-10-16 RX ORDER — ALBUTEROL SULFATE 90 UG/1
2 AEROSOL, METERED RESPIRATORY (INHALATION) EVERY 4 HOURS PRN
Qty: 1 INHALER | Refills: 0 | Status: SHIPPED | OUTPATIENT
Start: 2019-10-16 | End: 2019-10-28

## 2019-10-16 RX ORDER — DOXYCYCLINE HYCLATE 100 MG
100 TABLET ORAL 2 TIMES DAILY
Qty: 20 TAB | Refills: 0 | Status: SHIPPED | OUTPATIENT
Start: 2019-10-16 | End: 2019-10-26

## 2019-10-16 NOTE — PROGRESS NOTES
Chief Complaint   Patient presents with   • Cough       HISTORY OF PRESENT ILLNESS: Patient is a 64 y.o. female who presents today because she has a 3 to 4-day history of wheezing, coughing, phlegm production.  She has been taking over-the-counter medications without improvement.    Patient Active Problem List    Diagnosis Date Noted   • Obesity 03/06/2013     Priority: Medium   • Viral upper respiratory tract infection 01/04/2017   • Anxiety 02/24/2016   • Vitamin D deficiency 02/23/2016   • Reactive airway disease without complication 11/12/2014   • Incisional hernia 03/31/2014   • Hypertension    • Hyperlipidemia    • Hypothyroid        Allergies:Biaxin [clarithromycin]; Cipro xr; and Pain relief    Current Outpatient Medications Ordered in Epic   Medication Sig Dispense Refill   • albuterol 108 (90 Base) MCG/ACT Aero Soln inhalation aerosol Inhale 2 Puffs by mouth every four hours as needed. 1 Inhaler 0   • methylPREDNISolone (MEDROL DOSEPAK) 4 MG Tablet Therapy Pack Take 1 Tab by mouth See Admin Instructions. 21 Tab 0   • doxycycline (VIBRAMYCIN) 100 MG Tab Take 1 Tab by mouth 2 times a day for 10 days. 20 Tab 0   • enalapril (VASOTEC) 2.5 MG Tab Take 1 Tab by mouth every day. 90 Tab 3   • levothyroxine (SYNTHROID) 125 MCG Tab Take 1 Tab by mouth every morning before breakfast. 90 Tab 3   • simvastatin (ZOCOR) 20 MG Tab Take 1 Tab by mouth every evening. 90 Tab 3   • triamterene/hctz (MAXZIDE-25/DYAZIDE) 37.5-25 MG Cap Take 1 Cap by mouth every day. 90 Cap 3   • albuterol 108 (90 Base) MCG/ACT Aero Soln inhalation aerosol Inhale 2 Puffs by mouth every four hours as needed. 1 Inhaler 0   • benzonatate (TESSALON) 200 MG capsule Take 1 Cap by mouth 3 times a day as needed for Cough. (Patient not taking: Reported on 1/4/2019) 60 Cap 0   • alprazolam (XANAX) 0.5 MG Tab Take 1 Tab by mouth at bedtime as needed. 30 Tab 0   • Famotidine (PEPCID PO) Take  by mouth.     • fluticasone (FLOVENT HFA) 110 MCG/ACT Aerosol  Inhale 2 Puffs by mouth 2 times a day. (Patient not taking: Reported on 2019) 1 Inhaler 3   • cholecalciferol (VITAMIN D3) 5000 UNIT Cap Take 1 Cap by mouth every day. 90 Cap 3   • albuterol (VENTOLIN OR PROVENTIL) 108 (90 BASE) MCG/ACT Aero Soln inhalation aerosol Inhale 2 Puffs by mouth every 6 hours as needed for Shortness of Breath. 8.5 g 1   • Calcium Polycarbophil (FIBERCON PO) Take  by mouth 2 Times a Day. Takes two     • aspirin (ASA) 325 MG TABS Take 325 mg by mouth every 6 hours as needed.     • omeprazole (PRILOSEC) 20 MG CPDR Take 20 mg by mouth every day.       No current [facility]-ordered facility-administered medications on file.        Past Medical History:   Diagnosis Date   • Anxiety 2016   • Bronchitis    • Cholesterol blood decreased    • Cough 2014   • Diverticulitis    • Heart burn    • Hyperlipidemia    • Hypertension    • Hypothyroid    • Other specified disorder of intestines    • Viral upper respiratory tract infection 2017   • Vitamin D deficiency 2016       Social History     Tobacco Use   • Smoking status: Never Smoker   • Smokeless tobacco: Never Used   Substance Use Topics   • Alcohol use: No     Alcohol/week: 0.0 oz   • Drug use: No       Family Status   Relation Name Status   • Mo  Alive   • Fa     • OTHER  (Not Specified)     Family History   Problem Relation Age of Onset   • Stroke Mother    • Hypertension Mother    • Heart Disease Father 55   • Diabetes Other        ROS:  Review of Systems   Constitutional: Negative for fever, chills, weight loss and malaise/fatigue.   HENT: Negative for ear pain, nosebleeds, congestion, sore throat and neck pain.    Eyes: Negative for blurred vision.   Respiratory: Positive for cough, sputum production, shortness of breath and wheezing.    Cardiovascular: Negative for chest pain, palpitations, orthopnea and leg swelling.   Gastrointestinal: Negative for heartburn, nausea, vomiting and abdominal pain.   Genitourinary:  "Negative for dysuria, urgency and frequency.     Exam:  /72 (BP Location: Right arm, Patient Position: Sitting, BP Cuff Size: Large adult)   Pulse 100   Temp 36.6 °C (97.8 °F) (Temporal)   Resp 18   Ht 1.651 m (5' 5\")   Wt (!) 134.3 kg (296 lb)   SpO2 94%   General:  Well nourished, well developed female in NAD  Head:Normocephalic, atraumatic  Eyes: PERRLA, EOM within normal limits, no conjunctival injection, no scleral icterus, visual fields and acuity grossly intact.  Ears: Normal shape and symmetry, no tenderness, no discharge. External canals are without any significant edema or erythema. Tympanic membranes are without any inflammation, no effusion. Gross auditory acuity is intact  Nose: Symmetrical without tenderness, no discharge.  Mouth: reasonable hygiene, no erythema exudates or tonsillar enlargement.  Neck: no masses, range of motion within normal limits, no tracheal deviation. No obvious thyroid enlargement.  Pulmonary: chest is symmetrical with respiration, bronchial bilaterally with scattered wheezes, no rales or rhonchi   cardiovascular: regular rate and rhythm without murmurs, rubs, or gallops.  Extremities: no clubbing, cyanosis, or edema.    Please note that this dictation was created using voice recognition software. I have made every reasonable attempt to correct obvious errors, but I expect that there are errors of grammar and possibly content that I did not discover before finalizing the note.    Assessment/Plan:  1. Wheezing-associated respiratory infection (WARI)  albuterol 108 (90 Base) MCG/ACT Aero Soln inhalation aerosol    methylPREDNISolone (MEDROL DOSEPAK) 4 MG Tablet Therapy Pack    doxycycline (VIBRAMYCIN) 100 MG Tab       Followup with primary care in the next 7-10 days if not significantly improving, return to the urgent care or go to the emergency room sooner for any worsening of symptoms.       "

## 2019-10-28 ENCOUNTER — OFFICE VISIT (OUTPATIENT)
Dept: MEDICAL GROUP | Facility: PHYSICIAN GROUP | Age: 64
End: 2019-10-28
Payer: COMMERCIAL

## 2019-10-28 VITALS
HEIGHT: 65 IN | DIASTOLIC BLOOD PRESSURE: 78 MMHG | SYSTOLIC BLOOD PRESSURE: 148 MMHG | WEIGHT: 293 LBS | HEART RATE: 84 BPM | OXYGEN SATURATION: 97 % | RESPIRATION RATE: 14 BRPM | BODY MASS INDEX: 48.82 KG/M2 | TEMPERATURE: 98.4 F

## 2019-10-28 DIAGNOSIS — E78.00 PURE HYPERCHOLESTEROLEMIA: ICD-10-CM

## 2019-10-28 DIAGNOSIS — E66.01 CLASS 3 SEVERE OBESITY DUE TO EXCESS CALORIES WITHOUT SERIOUS COMORBIDITY WITH BODY MASS INDEX (BMI) OF 40.0 TO 44.9 IN ADULT (HCC): ICD-10-CM

## 2019-10-28 DIAGNOSIS — J45.20 MILD INTERMITTENT REACTIVE AIRWAY DISEASE WITHOUT COMPLICATION: ICD-10-CM

## 2019-10-28 DIAGNOSIS — I10 ESSENTIAL HYPERTENSION: ICD-10-CM

## 2019-10-28 DIAGNOSIS — E03.4 HYPOTHYROIDISM DUE TO ACQUIRED ATROPHY OF THYROID: ICD-10-CM

## 2019-10-28 PROCEDURE — 99214 OFFICE O/P EST MOD 30 MIN: CPT | Performed by: INTERNAL MEDICINE

## 2019-10-28 RX ORDER — ENALAPRIL MALEATE 5 MG/1
5 TABLET ORAL DAILY
Qty: 90 TAB | Refills: 3 | Status: SHIPPED | OUTPATIENT
Start: 2019-10-28 | End: 2019-11-18

## 2019-10-28 RX ORDER — MONTELUKAST SODIUM 10 MG/1
10 TABLET ORAL
Qty: 30 TAB | Refills: 3 | Status: SHIPPED | OUTPATIENT
Start: 2019-10-28 | End: 2020-02-14

## 2019-10-28 NOTE — PATIENT INSTRUCTIONS
Increase enalapril to 5 mg a day, continue the hydrochlorothiazide  Blood pressure monitoring,  Goal blood pressure is 135/85 or below most of the time    I recommend blood pressure report every month to 6 weeks.  Singulair 10 mg 1 tablet a day, watch and see if the cough goes away completely, if it does continue the Singulair.    Ideally you should not need to utilize your inhaler at all, if you are having the cough especially if starting a flu, start taking the albuterol 3 times a day, if the cough persists under still problems after 5 to 7 days urgent care for consideration of steroids and antibiotics.    Labs and follow-up in 6 months.  Earlier if any problems.

## 2019-10-28 NOTE — ASSESSMENT & PLAN NOTE
Continues with the maxide and minimal enalapril at 2.5 mg a day.  Patient reports home bp readings in the 140/80 range.  She does note the diuretic has more urine frequency with the diuretic.  Denies chest pain palpitations or edema.

## 2019-10-28 NOTE — ASSESSMENT & PLAN NOTE
S/p recent  10/16/19 received albuterol mdi, medrol dose nicole and doxycyline.  She has slight polycythemia on her last labs.  Patient reports her cough is much better after course of medrol, doxycyline and mdi inhaler.  States not using the inhaler currently.

## 2019-10-28 NOTE — PROGRESS NOTES
Chief Complaint   Patient presents with   • Anxiety       HISTORY OF PRESENT ILLNESS: Patient is a 64 y.o. female established patient who presents today to discuss the medical issues below.    Obesity  Weight remains essentially unchanged.      Hyperlipidemia  Last labs Dec 2018,  on statin as simvastatin 20 mg    Hypertension  Continues with the maxide and minimal enalapril at 2.5 mg a day.  Patient reports home bp readings in the 140/80 range.  She does note the diuretic has more urine frequency with the diuretic.  Denies chest pain palpitations or edema.    Reactive airway disease without complication  S/p recent UC 10/16/19 received albuterol mdi, medrol dose nicole and doxycyline.  She has slight polycythemia on her last labs.  Patient reports her cough is much better after course of medrol, doxycyline and mdi inhaler.  States not using the inhaler currently.        Patient Active Problem List    Diagnosis Date Noted   • Obesity 03/06/2013     Priority: Medium   • Viral upper respiratory tract infection 01/04/2017   • Anxiety 02/24/2016   • Vitamin D deficiency 02/23/2016   • Reactive airway disease without complication 11/12/2014   • Incisional hernia 03/31/2014   • Hypertension    • Hyperlipidemia    • Hypothyroid        Allergies:Biaxin [clarithromycin]; Cipro xr; and Pain relief    Current Outpatient Medications   Medication Sig Dispense Refill   • montelukast (SINGULAIR) 10 MG Tab Take 1 Tab by mouth 1 time daily as needed. 30 Tab 3   • enalapril (VASOTEC) 5 MG Tab Take 1 Tab by mouth every day. 90 Tab 3   • levothyroxine (SYNTHROID) 125 MCG Tab Take 1 Tab by mouth every morning before breakfast. 90 Tab 3   • simvastatin (ZOCOR) 20 MG Tab Take 1 Tab by mouth every evening. 90 Tab 3   • triamterene/hctz (MAXZIDE-25/DYAZIDE) 37.5-25 MG Cap Take 1 Cap by mouth every day. 90 Cap 3   • alprazolam (XANAX) 0.5 MG Tab Take 1 Tab by mouth at bedtime as needed. 30 Tab 0   • Famotidine (PEPCID PO) Take  by  mouth.     • cholecalciferol (VITAMIN D3) 5000 UNIT Cap Take 1 Cap by mouth every day. 90 Cap 3   • albuterol (VENTOLIN OR PROVENTIL) 108 (90 BASE) MCG/ACT Aero Soln inhalation aerosol Inhale 2 Puffs by mouth every 6 hours as needed for Shortness of Breath. 8.5 g 1   • Calcium Polycarbophil (FIBERCON PO) Take  by mouth 2 Times a Day. Takes two     • aspirin (ASA) 325 MG TABS Take 325 mg by mouth every 6 hours as needed.     • omeprazole (PRILOSEC) 20 MG CPDR Take 20 mg by mouth every day.     • fluticasone (FLOVENT HFA) 110 MCG/ACT Aerosol Inhale 2 Puffs by mouth 2 times a day. (Patient not taking: Reported on 2019) 1 Inhaler 3     No current facility-administered medications for this visit.          Past Medical History:   Diagnosis Date   • Anxiety 2016   • Bronchitis    • Cholesterol blood decreased    • Cough 2014   • Diverticulitis    • Heart burn    • Hyperlipidemia    • Hypertension    • Hypothyroid    • Other specified disorder of intestines    • Viral upper respiratory tract infection 2017   • Vitamin D deficiency 2016       Social History     Tobacco Use   • Smoking status: Never Smoker   • Smokeless tobacco: Never Used   Substance Use Topics   • Alcohol use: No     Alcohol/week: 0.0 oz   • Drug use: No       Family Status   Relation Name Status   • Mo  Alive   • Fa     • OTHER  (Not Specified)     Family History   Problem Relation Age of Onset   • Stroke Mother    • Hypertension Mother    • Heart Disease Father 55   • Diabetes Other        ROS:    Respiratory: Negative for cough, sputum production, shortness of breath or wheezing.    Cardiovascular: Negative for chest pain, palpitations, orthopnea, dyspnea with exertion or edema.   Gastrointestinal: Negative for GI upset, nausea, vomiting, abdominal pain, constipation or diarrhea.   Genitourinary: Negative for dysuria, urgency, hesitancy or frequency.       Exam:    /78 (BP Location: Right arm, Patient Position:  "Sitting, BP Cuff Size: Adult)   Pulse 84   Temp 36.9 °C (98.4 °F)   Resp 14   Ht 1.651 m (5' 5\")   Wt (!) 133.8 kg (295 lb)   SpO2 97%   General:  Well nourished, well developed female in NAD.  HENT: Normocephalic, bilateral TMs are intact, nasal and oral mucosa with no lesions,   Pulmonary: Clear to ausculation and percussion.  Normal effort. No rales, rhonchi, or wheezing.  Cardiovascular: Regular rate and rhythm without murmur.   Abdomen: Normal bowel sounds soft and nontender no palpable liver spleen bladder mass.  Extremities: No LE edema noted.  Neuro: Grossly nonfocal.  Psych: Alert and oriented to person, place, and time. Appropriate mood and conversation.        This dictation was created using voice recognition software. I have made reasonable attempts to correct errors, however, errors of grammar and content may exist.          Assessment/Plan:    1. Class 3 severe obesity due to excess calories without serious comorbidity with body mass index (BMI) of 40.0 to 44.9 in adult (HCC)  No weight change.  Discussed briefly    2. Pure hypercholesterolemia  Will be due for laboratory assessment at follow-up continuing on statin  - Lipid Profile; Future    3. Essential hypertension  Blood pressure goal discussed at length with patient again.  Today she agrees to increasing her enalapril to 5 mg a day.  Monitor dry nonproductive cough.  - Comp Metabolic Panel; Future  - CBC WITH DIFFERENTIAL; Future    4. Mild intermittent reactive airway disease without complication  Cough responded to steroids and antibiotics making the cough more likely asthma with secondary infection.  Discussed Singulair trial.  PRN albuterol.  Monitor cough.  Consideration of trial of antihypertensive off of ACE at follow-up if cough persisting.    5. Hypothyroidism due to acquired atrophy of thyroid  Clinically euthyroid borderline lab testing at last draw.  Recommend labs.  - TSH WITH REFLEX TO FT4; Future    6.  Anxiety  Patient does " not identify this is a problem with me today in spite of having touch bases with the nurse about anxiety follow-up.  Support given.  She does identify stress at work.     Patient was seen for 25 minutes face to face of which more than 50% of the time was spent in counseling and coordination of care regarding the above problems.

## 2019-11-17 ENCOUNTER — PATIENT MESSAGE (OUTPATIENT)
Dept: MEDICAL GROUP | Facility: PHYSICIAN GROUP | Age: 64
End: 2019-11-17

## 2019-11-18 ENCOUNTER — PATIENT MESSAGE (OUTPATIENT)
Dept: MEDICAL GROUP | Facility: PHYSICIAN GROUP | Age: 64
End: 2019-11-18

## 2019-11-18 RX ORDER — ENALAPRIL MALEATE 10 MG/1
10 TABLET ORAL DAILY
Qty: 30 TAB | Refills: 5 | Status: SHIPPED
Start: 2019-11-18 | End: 2020-05-04

## 2019-11-19 ENCOUNTER — TELEPHONE (OUTPATIENT)
Dept: MEDICAL GROUP | Facility: PHYSICIAN GROUP | Age: 64
End: 2019-11-19

## 2019-11-19 RX ORDER — IRBESARTAN 150 MG/1
150 TABLET ORAL DAILY
Qty: 30 TAB | Refills: 3 | Status: SHIPPED | OUTPATIENT
Start: 2019-11-19 | End: 2020-03-04

## 2019-11-19 NOTE — PATIENT COMMUNICATION
I would recommend we attempt stopping the enalapril, change that to an entirely new medication: irbesartan 150 mg 1 in am  Watch to see if your cough resolves  If you have edema you can take a dyazide  bp monitor  Let us know how the bp is doing after abut 7-10 days.      I sent the Irbesartan rx to local pharmacy at [facility] for just 30 tabs to see if this will work prior to buying the 90 day supply.

## 2019-11-19 NOTE — TELEPHONE ENCOUNTER
Pt would like clarification about the changes to her medications, as well as what the new RX prescribed is for. She would like a message on [employer], or a call on her cell phone at [phone].  She is confused about the Dyazide as well.   Thank you .

## 2019-11-19 NOTE — TELEPHONE ENCOUNTER
Please see the my chart note from 11/18/19:  Patient has a cough and poorly controlled bp on lisinopril.  I recommended she stop the lisinopril and start Irbesartan.  This is a different kind of bp medication which I expect to work better on her bp and may resolve the cough.      She can just take the dyazide if she has edema, she doesn't need to take it every day.  The new medication may help the ankle swelling even if not taking the dyazide.      She should come by and have us check the bp a few times next week.      rx went to [facility] locally, so we can see if this medication is effective.     If more questions, schedule an OV to discuss.

## 2019-11-22 ENCOUNTER — OFFICE VISIT (OUTPATIENT)
Dept: URGENT CARE | Facility: PHYSICIAN GROUP | Age: 64
End: 2019-11-22
Payer: COMMERCIAL

## 2019-11-22 VITALS
RESPIRATION RATE: 18 BRPM | WEIGHT: 293 LBS | DIASTOLIC BLOOD PRESSURE: 82 MMHG | BODY MASS INDEX: 48.82 KG/M2 | HEIGHT: 65 IN | SYSTOLIC BLOOD PRESSURE: 128 MMHG | OXYGEN SATURATION: 94 % | TEMPERATURE: 98.6 F | HEART RATE: 90 BPM

## 2019-11-22 DIAGNOSIS — J98.11 ATELECTASIS: ICD-10-CM

## 2019-11-22 DIAGNOSIS — E66.01 MORBID OBESITY WITH BMI OF 45.0-49.9, ADULT (HCC): ICD-10-CM

## 2019-11-22 PROCEDURE — 99214 OFFICE O/P EST MOD 30 MIN: CPT | Performed by: PHYSICIAN ASSISTANT

## 2019-11-22 NOTE — PROGRESS NOTES
Chief Complaint   Patient presents with   • Cough   • Pharyngitis       HISTORY OF PRESENT ILLNESS: Patient is a 64 y.o. female who presents today because she has a 3-day history of evening low-grade fevers.  She does not feel sick, does not have any shortness of breath, no phlegm production but does have kind of a dry annoying cough.  Her fevers, in the evenings after she has sat down and started to relax from being working all day.    Patient Active Problem List    Diagnosis Date Noted   • Obesity 03/06/2013     Priority: Medium   • Morbid obesity with BMI of 45.0-49.9, adult ([facility]) 11/22/2019   • Viral upper respiratory tract infection 01/04/2017   • Anxiety 02/24/2016   • Vitamin D deficiency 02/23/2016   • Reactive airway disease without complication 11/12/2014   • Incisional hernia 03/31/2014   • Hypertension    • Hyperlipidemia    • Hypothyroid        Allergies:Biaxin [clarithromycin]; Cipro xr; and Pain relief    Current Outpatient Medications Ordered in Epic   Medication Sig Dispense Refill   • enalapril (VASOTEC) 10 MG Tab Take 1 Tab by mouth every day. 30 Tab 5   • montelukast (SINGULAIR) 10 MG Tab Take 1 Tab by mouth 1 time daily as needed. 30 Tab 3   • levothyroxine (SYNTHROID) 125 MCG Tab Take 1 Tab by mouth every morning before breakfast. 90 Tab 3   • simvastatin (ZOCOR) 20 MG Tab Take 1 Tab by mouth every evening. 90 Tab 3   • triamterene/hctz (MAXZIDE-25/DYAZIDE) 37.5-25 MG Cap Take 1 Cap by mouth every day. 90 Cap 3   • alprazolam (XANAX) 0.5 MG Tab Take 1 Tab by mouth at bedtime as needed. 30 Tab 0   • Famotidine (PEPCID PO) Take  by mouth.     • cholecalciferol (VITAMIN D3) 5000 UNIT Cap Take 1 Cap by mouth every day. 90 Cap 3   • Calcium Polycarbophil (FIBERCON PO) Take  by mouth 2 Times a Day. Takes two     • aspirin (ASA) 325 MG TABS Take 325 mg by mouth every 6 hours as needed.     • omeprazole (PRILOSEC) 20 MG CPDR Take 20 mg by mouth every day.     • irbesartan (AVAPRO) 150 MG Tab Take 1 Tab  by mouth every day. 30 Tab 3   • fluticasone (FLOVENT HFA) 110 MCG/ACT Aerosol Inhale 2 Puffs by mouth 2 times a day. (Patient not taking: Reported on 2019) 1 Inhaler 3   • albuterol (VENTOLIN OR PROVENTIL) 108 (90 BASE) MCG/ACT Aero Soln inhalation aerosol Inhale 2 Puffs by mouth every 6 hours as needed for Shortness of Breath. 8.5 g 1     No current [facility]-ordered facility-administered medications on file.        Past Medical History:   Diagnosis Date   • Anxiety 2016   • Bronchitis    • Cholesterol blood decreased    • Cough 2014   • Diverticulitis    • Heart burn    • Hyperlipidemia    • Hypertension    • Hypothyroid    • Other specified disorder of intestines    • Viral upper respiratory tract infection 2017   • Vitamin D deficiency 2016       Social History     Tobacco Use   • Smoking status: Never Smoker   • Smokeless tobacco: Never Used   Substance Use Topics   • Alcohol use: No     Alcohol/week: 0.0 oz   • Drug use: No       Family Status   Relation Name Status   • Mo  Alive   • Fa     • OTHER  (Not Specified)     Family History   Problem Relation Age of Onset   • Stroke Mother    • Hypertension Mother    • Heart Disease Father 55   • Diabetes Other        ROS:  Review of Systems   Constitutional: Patient reports evening fever, no chills, weight loss and malaise/fatigue.   HENT: Negative for ear pain, nosebleeds, congestion, sore throat and neck pain.    Eyes: Negative for blurred vision.   Respiratory: Positive for mild dry cough, no sputum production, shortness of breath and wheezing.    Cardiovascular: Negative for chest pain, palpitations, orthopnea and leg swelling.   Gastrointestinal: Negative for heartburn, nausea, vomiting and abdominal pain.   Genitourinary: Negative for dysuria, urgency and frequency.     Exam:  /82 (BP Location: Right arm, Patient Position: Sitting, BP Cuff Size: Large adult)   Pulse 90   Temp 37 °C (98.6 °F) (Temporal)   Resp 18   Ht  "1.651 m (5' 5\")   Wt (!) 136.1 kg (300 lb)   SpO2 94%   General:  Well nourished, well developed female in NAD  Head:Normocephalic, atraumatic  Eyes: PERRLA, EOM within normal limits, no conjunctival injection, no scleral icterus, visual fields and acuity grossly intact.  Ears: Normal shape and symmetry, no tenderness, no discharge. External canals are without any significant edema or erythema. Tympanic membranes are without any inflammation, no effusion. Gross auditory acuity is intact  Nose: Symmetrical without tenderness, no discharge.  Mouth: reasonable hygiene, no erythema exudates or tonsillar enlargement.  Neck: no masses, range of motion within normal limits, no tracheal deviation. No obvious thyroid enlargement.  Pulmonary: chest is symmetrical with respiration, few bibasilar inspiratory rales, no wheezes or rhonchi cardiovascular: regular rate and rhythm without murmurs, rubs, or gallops.  Extremities: no clubbing, cyanosis, or edema.    Please note that this dictation was created using voice recognition software. I have made every reasonable attempt to correct obvious errors, but I expect that there are errors of grammar and possibly content that I did not discover before finalizing the note.    Assessment/Plan:  1. Atelectasis     2. Morbid obesity with BMI of 45.0-49.9, adult (HCC)  Patient identified as having weight management issue.  Appropriate orders and counseling given.   Patient is morbidly obese, also has a recent respiratory illness, along with symptomology and physical exam findings  most likely atelectasis.  Recommended deep breathing and coughing techniques.  Patient thinks she has an incentive spirometer at home from her 's surgery, recommended to use of that in the evenings    Followup with primary care in the next 7-10 days if not significantly improving, return to the urgent care or go to the emergency room sooner for any worsening of symptoms.       "

## 2019-12-03 ENCOUNTER — TELEPHONE (OUTPATIENT)
Dept: MEDICAL GROUP | Facility: PHYSICIAN GROUP | Age: 64
End: 2019-12-03

## 2019-12-03 ENCOUNTER — NON-PROVIDER VISIT (OUTPATIENT)
Dept: MEDICAL GROUP | Facility: PHYSICIAN GROUP | Age: 64
End: 2019-12-03
Payer: COMMERCIAL

## 2019-12-03 VITALS — DIASTOLIC BLOOD PRESSURE: 74 MMHG | SYSTOLIC BLOOD PRESSURE: 146 MMHG | HEART RATE: 85 BPM

## 2019-12-04 NOTE — NON-PROVIDER
Elizabeth Mendoza is a 64 y.o. female here for a non-provider visit for BP Check    Vitals:    12/03/19 1550 12/03/19 1558   BP: 152/84 146/74   BP Location: Right arm Right arm   Patient Position: Sitting Sitting   BP Cuff Size: Adult long Adult long   Pulse: 85      If abnormal, was an in office provider notified today? Yes  Routed to PCP? Yes    Pt states no active symptoms, has been taking medication but wasn't able to start till 11/26/19 as it was not in stock at pharmacy. Pt will come into clinic for another BP Check next week.

## 2020-02-27 ENCOUNTER — OFFICE VISIT (OUTPATIENT)
Dept: URGENT CARE | Facility: PHYSICIAN GROUP | Age: 65
End: 2020-02-27
Payer: COMMERCIAL

## 2020-02-27 VITALS
TEMPERATURE: 99.1 F | HEART RATE: 104 BPM | HEIGHT: 65 IN | OXYGEN SATURATION: 95 % | BODY MASS INDEX: 48.82 KG/M2 | SYSTOLIC BLOOD PRESSURE: 126 MMHG | WEIGHT: 293 LBS | DIASTOLIC BLOOD PRESSURE: 62 MMHG | RESPIRATION RATE: 18 BRPM

## 2020-02-27 DIAGNOSIS — R06.2 WHEEZE: ICD-10-CM

## 2020-02-27 DIAGNOSIS — R05.9 COUGH: ICD-10-CM

## 2020-02-27 DIAGNOSIS — J22 LOWER RESPIRATORY INFECTION (E.G., BRONCHITIS, PNEUMONIA, PNEUMONITIS, PULMONITIS): ICD-10-CM

## 2020-02-27 PROCEDURE — 99214 OFFICE O/P EST MOD 30 MIN: CPT | Performed by: PHYSICIAN ASSISTANT

## 2020-02-27 RX ORDER — ALBUTEROL SULFATE 90 UG/1
2 AEROSOL, METERED RESPIRATORY (INHALATION) EVERY 4 HOURS PRN
Qty: 1 INHALER | Refills: 2 | Status: SHIPPED | OUTPATIENT
Start: 2020-02-27 | End: 2020-03-31

## 2020-02-27 RX ORDER — DOXYCYCLINE HYCLATE 100 MG
100 TABLET ORAL 2 TIMES DAILY
Qty: 20 TAB | Refills: 0 | Status: SHIPPED | OUTPATIENT
Start: 2020-02-27 | End: 2020-03-08

## 2020-02-27 RX ORDER — IRBESARTAN 300 MG/1
TABLET ORAL
COMMUNITY
Start: 2020-02-13 | End: 2020-03-31

## 2020-02-27 RX ORDER — DEXTROMETHORPHAN HYDROBROMIDE AND PROMETHAZINE HYDROCHLORIDE 15; 6.25 MG/5ML; MG/5ML
5 SYRUP ORAL EVERY 4 HOURS PRN
Qty: 120 ML | Refills: 0 | Status: SHIPPED | OUTPATIENT
Start: 2020-02-27 | End: 2020-05-04

## 2020-02-27 NOTE — PROGRESS NOTES
Chief Complaint   Patient presents with   • Cough       HISTORY OF PRESENT ILLNESS: Patient is a 64 y.o. female who presents today because she has a 5-day history of cough, wheezing, shortness of breath, difficulty sleeping because of the cough and phlegm production.  She has been taking over-the-counter medications without improvement    Patient Active Problem List    Diagnosis Date Noted   • Obesity 03/06/2013     Priority: Medium   • Morbid obesity with BMI of 45.0-49.9, adult ([facility]) 11/22/2019   • Viral upper respiratory tract infection 01/04/2017   • Anxiety 02/24/2016   • Vitamin D deficiency 02/23/2016   • Reactive airway disease without complication 11/12/2014   • Incisional hernia 03/31/2014   • Hypertension    • Hyperlipidemia    • Hypothyroid        Allergies:Biaxin [clarithromycin]; Cipro xr; and Pain relief    Current Outpatient Medications Ordered in Epic   Medication Sig Dispense Refill   • promethazine-dextromethorphan (PROMETHAZINE-DM) 6.25-15 MG/5ML syrup Take 5 mL by mouth every four hours as needed for Cough. 120 mL 0   • albuterol 108 (90 Base) MCG/ACT Aero Soln inhalation aerosol Inhale 2 Puffs by mouth every four hours as needed. 1 Inhaler 2   • doxycycline (VIBRAMYCIN) 100 MG Tab Take 1 Tab by mouth 2 times a day for 10 days. 20 Tab 0   • montelukast (SINGULAIR) 10 MG Tab TAKE 1 TAB BY MOUTH 1 TIME DAILY AS NEEDED. 30 Tab 5   • irbesartan (AVAPRO) 150 MG Tab Take 1 Tab by mouth every day. 30 Tab 3   • levothyroxine (SYNTHROID) 125 MCG Tab Take 1 Tab by mouth every morning before breakfast. 90 Tab 3   • simvastatin (ZOCOR) 20 MG Tab Take 1 Tab by mouth every evening. 90 Tab 3   • triamterene/hctz (MAXZIDE-25/DYAZIDE) 37.5-25 MG Cap Take 1 Cap by mouth every day. 90 Cap 3   • alprazolam (XANAX) 0.5 MG Tab Take 1 Tab by mouth at bedtime as needed. 30 Tab 0   • Famotidine (PEPCID PO) Take  by mouth.     • cholecalciferol (VITAMIN D3) 5000 UNIT Cap Take 1 Cap by mouth every day. 90 Cap 3   •  albuterol (VENTOLIN OR PROVENTIL) 108 (90 BASE) MCG/ACT Aero Soln inhalation aerosol Inhale 2 Puffs by mouth every 6 hours as needed for Shortness of Breath. 8.5 g 1   • Calcium Polycarbophil (FIBERCON PO) Take  by mouth 2 Times a Day. Takes two     • aspirin (ASA) 325 MG TABS Take 325 mg by mouth every 6 hours as needed.     • omeprazole (PRILOSEC) 20 MG CPDR Take 20 mg by mouth every day.     • irbesartan (AVAPRO) 300 MG Tab TAKE 1/2 TABLET (150MG) BY MOUTH EVERY DAY     • enalapril (VASOTEC) 10 MG Tab Take 1 Tab by mouth every day. 30 Tab 5   • fluticasone (FLOVENT HFA) 110 MCG/ACT Aerosol Inhale 2 Puffs by mouth 2 times a day. (Patient not taking: Reported on 2019) 1 Inhaler 3     No current Epic-ordered facility-administered medications on file.        Past Medical History:   Diagnosis Date   • Anxiety 2016   • Bronchitis    • Cholesterol blood decreased    • Cough 2014   • Diverticulitis    • Heart burn    • Hyperlipidemia    • Hypertension    • Hypothyroid    • Other specified disorder of intestines    • Viral upper respiratory tract infection 2017   • Vitamin D deficiency 2016       Social History     Tobacco Use   • Smoking status: Never Smoker   • Smokeless tobacco: Never Used   Substance Use Topics   • Alcohol use: No     Alcohol/week: 0.0 oz   • Drug use: No       Family Status   Relation Name Status   • Mo  Alive   • Fa     • OTHER  (Not Specified)     Family History   Problem Relation Age of Onset   • Stroke Mother    • Hypertension Mother    • Heart Disease Father 55   • Diabetes Other        ROS:  Review of Systems   Constitutional: Positive for fever, chills, myalgias and malaise/fatigue.   HENT: Negative for ear pain, nosebleeds, congestion, sore throat and neck pain.    Eyes: Negative for blurred vision.   Respiratory: Positive for cough, sputum production, shortness of breath and wheezing.    Cardiovascular: Negative for chest pain, palpitations, orthopnea and leg  "swelling.   Gastrointestinal: Negative for heartburn, nausea, vomiting and abdominal pain.   Genitourinary: Negative for dysuria, urgency and frequency.     Exam:  /62 (BP Location: Right arm, Patient Position: Sitting, BP Cuff Size: Large adult)   Pulse (!) 104   Temp 37.3 °C (99.1 °F) (Temporal)   Resp 18   Ht 1.651 m (5' 5\")   Wt (!) 135.2 kg (298 lb)   SpO2 95%   General:  Well nourished, well developed female in NAD  Head:Normocephalic, atraumatic  Eyes: PERRLA, EOM within normal limits, no conjunctival injection, no scleral icterus, visual fields and acuity grossly intact.  Ears: Normal shape and symmetry, no tenderness, no discharge. External canals are without any significant edema or erythema. Tympanic membranes are without any inflammation, no effusion. Gross auditory acuity is intact  Nose: Symmetrical without tenderness, no discharge.  Mouth: reasonable hygiene, no erythema exudates or tonsillar enlargement.  Neck: no masses, range of motion within normal limits, no tracheal deviation. No obvious thyroid enlargement.  Pulmonary: chest is symmetrical with respiration, she has wheezes and rhonchi bilaterally, not clearing with cough   cardiovascular: regular rate and rhythm without murmurs, rubs, or gallops.  Extremities: no clubbing, cyanosis, or edema.    Please note that this dictation was created using voice recognition software. I have made every reasonable attempt to correct obvious errors, but I expect that there are errors of grammar and possibly content that I did not discover before finalizing the note.    Assessment/Plan:  1. Lower respiratory infection (e.g., bronchitis, pneumonia, pneumonitis, pulmonitis)  doxycycline (VIBRAMYCIN) 100 MG Tab   2. Cough  promethazine-dextromethorphan (PROMETHAZINE-DM) 6.25-15 MG/5ML syrup   3. Wheeze  albuterol 108 (90 Base) MCG/ACT Aero Soln inhalation aerosol       Followup with primary care in the next 7-10 days if not significantly improving, " return to the urgent care or go to the emergency room sooner for any worsening of symptoms.

## 2020-03-07 ENCOUNTER — OFFICE VISIT (OUTPATIENT)
Dept: URGENT CARE | Facility: PHYSICIAN GROUP | Age: 65
End: 2020-03-07
Payer: COMMERCIAL

## 2020-03-07 VITALS
DIASTOLIC BLOOD PRESSURE: 80 MMHG | HEART RATE: 102 BPM | RESPIRATION RATE: 16 BRPM | WEIGHT: 293 LBS | TEMPERATURE: 98.4 F | OXYGEN SATURATION: 92 % | BODY MASS INDEX: 48.82 KG/M2 | SYSTOLIC BLOOD PRESSURE: 122 MMHG | HEIGHT: 65 IN

## 2020-03-07 DIAGNOSIS — J40 BRONCHITIS: ICD-10-CM

## 2020-03-07 PROCEDURE — 99214 OFFICE O/P EST MOD 30 MIN: CPT | Performed by: PHYSICIAN ASSISTANT

## 2020-03-07 RX ORDER — METHYLPREDNISOLONE 4 MG/1
4 TABLET ORAL SEE ADMIN INSTRUCTIONS
Qty: 21 TAB | Refills: 0 | Status: SHIPPED | OUTPATIENT
Start: 2020-03-07 | End: 2020-03-31

## 2020-03-07 ASSESSMENT — FIBROSIS 4 INDEX: FIB4 SCORE: 0.98

## 2020-03-07 NOTE — PROGRESS NOTES
Chief Complaint   Patient presents with   • Cough       HISTORY OF PRESENT ILLNESS: Patient is a 64 y.o. female who presents today because she is on day 10 of doxycycline and is generally feeling better than she was when she first started but she has a very harsh deep frequent cough.  It is dry now.  Denies any fevers.    Patient Active Problem List    Diagnosis Date Noted   • Obesity 03/06/2013     Priority: Medium   • Morbid obesity with BMI of 45.0-49.9, adult ([facility]) 11/22/2019   • Viral upper respiratory tract infection 01/04/2017   • Anxiety 02/24/2016   • Vitamin D deficiency 02/23/2016   • Reactive airway disease without complication 11/12/2014   • Incisional hernia 03/31/2014   • Hypertension    • Hyperlipidemia    • Hypothyroid        Allergies:Biaxin [clarithromycin]; Cipro xr; and Pain relief    Current Outpatient Medications Ordered in Epic   Medication Sig Dispense Refill   • methylPREDNISolone (MEDROL DOSEPAK) 4 MG Tablet Therapy Pack Take 1 Tab by mouth See Admin Instructions. 21 Tab 0   • irbesartan (AVAPRO) 300 MG Tab TAKE 1/2 TABLET (150MG) BY MOUTH EVERY DAY 45 Tab 3   • irbesartan (AVAPRO) 300 MG Tab TAKE 1/2 TABLET (150MG) BY MOUTH EVERY DAY     • promethazine-dextromethorphan (PROMETHAZINE-DM) 6.25-15 MG/5ML syrup Take 5 mL by mouth every four hours as needed for Cough. 120 mL 0   • albuterol 108 (90 Base) MCG/ACT Aero Soln inhalation aerosol Inhale 2 Puffs by mouth every four hours as needed. 1 Inhaler 2   • doxycycline (VIBRAMYCIN) 100 MG Tab Take 1 Tab by mouth 2 times a day for 10 days. 20 Tab 0   • montelukast (SINGULAIR) 10 MG Tab TAKE 1 TAB BY MOUTH 1 TIME DAILY AS NEEDED. 30 Tab 5   • enalapril (VASOTEC) 10 MG Tab Take 1 Tab by mouth every day. 30 Tab 5   • levothyroxine (SYNTHROID) 125 MCG Tab Take 1 Tab by mouth every morning before breakfast. 90 Tab 3   • simvastatin (ZOCOR) 20 MG Tab Take 1 Tab by mouth every evening. 90 Tab 3   • triamterene/hctz (MAXZIDE-25/DYAZIDE) 37.5-25 MG Cap  Take 1 Cap by mouth every day. 90 Cap 3   • alprazolam (XANAX) 0.5 MG Tab Take 1 Tab by mouth at bedtime as needed. 30 Tab 0   • Famotidine (PEPCID PO) Take  by mouth.     • fluticasone (FLOVENT HFA) 110 MCG/ACT Aerosol Inhale 2 Puffs by mouth 2 times a day. (Patient not taking: Reported on 2019) 1 Inhaler 3   • cholecalciferol (VITAMIN D3) 5000 UNIT Cap Take 1 Cap by mouth every day. 90 Cap 3   • albuterol (VENTOLIN OR PROVENTIL) 108 (90 BASE) MCG/ACT Aero Soln inhalation aerosol Inhale 2 Puffs by mouth every 6 hours as needed for Shortness of Breath. 8.5 g 1   • Calcium Polycarbophil (FIBERCON PO) Take  by mouth 2 Times a Day. Takes two     • aspirin (ASA) 325 MG TABS Take 325 mg by mouth every 6 hours as needed.     • omeprazole (PRILOSEC) 20 MG CPDR Take 20 mg by mouth every day.       No current [facility]-ordered facility-administered medications on file.        Past Medical History:   Diagnosis Date   • Anxiety 2016   • Bronchitis    • Cholesterol blood decreased    • Cough 2014   • Diverticulitis    • Heart burn    • Hyperlipidemia    • Hypertension    • Hypothyroid    • Other specified disorder of intestines    • Viral upper respiratory tract infection 2017   • Vitamin D deficiency 2016       Social History     Tobacco Use   • Smoking status: Never Smoker   • Smokeless tobacco: Never Used   Substance Use Topics   • Alcohol use: No     Alcohol/week: 0.0 oz   • Drug use: No       Family Status   Relation Name Status   • Mo  Alive   • Fa     • OTHER  (Not Specified)     Family History   Problem Relation Age of Onset   • Stroke Mother    • Hypertension Mother    • Heart Disease Father 55   • Diabetes Other        ROS:  Review of Systems   Constitutional: Negative for fever, chills, weight loss and malaise/fatigue.   HENT: Negative for ear pain, nosebleeds, congestion, sore throat and neck pain.    Eyes: Negative for blurred vision.   Respiratory: Positive for cough, no sputum  "production, shortness of breath and wheezing.    Cardiovascular: Negative for chest pain, palpitations, orthopnea and leg swelling.   Gastrointestinal: Negative for heartburn, nausea, vomiting and abdominal pain.   Genitourinary: Negative for dysuria, urgency and frequency.     Exam:  /80   Pulse (!) 102   Temp 36.9 °C (98.4 °F)   Resp 16   Ht 1.651 m (5' 5\")   Wt (!) 135.2 kg (298 lb)   SpO2 92%   General:  Well nourished, well developed female in NAD, frequent harsh dry cough in the office  Head:Normocephalic, atraumatic  Eyes: PERRLA, EOM within normal limits, no conjunctival injection, no scleral icterus, visual fields and acuity grossly intact.  Ears: Normal shape and symmetry, no tenderness, no discharge. External canals are without any significant edema or erythema. Tympanic membranes are without any inflammation, no effusion. Gross auditory acuity is intact  Nose: Symmetrical without tenderness, no discharge.  Mouth: reasonable hygiene, no erythema exudates or tonsillar enlargement.  Neck: no masses, range of motion within normal limits, no tracheal deviation. No obvious thyroid enlargement.  Pulmonary: chest is symmetrical with respiration, no wheezes, crackles, or rhonchi.  Bronchial bilaterally  Cardiovascular: regular rate and rhythm without murmurs, rubs, or gallops.  Extremities: no clubbing, cyanosis, or edema.    Please note that this dictation was created using voice recognition software. I have made every reasonable attempt to correct obvious errors, but I expect that there are errors of grammar and possibly content that I did not discover before finalizing the note.    Assessment/Plan:  1. Bronchitis  methylPREDNISolone (MEDROL DOSEPAK) 4 MG Tablet Therapy Pack   Continue current symptomatic relief as needed    Followup with primary care in the next 7-10 days if not significantly improving, return to the urgent care or go to the emergency room sooner for any worsening of symptoms.   "

## 2020-03-11 ENCOUNTER — OFFICE VISIT (OUTPATIENT)
Dept: URGENT CARE | Facility: PHYSICIAN GROUP | Age: 65
End: 2020-03-11
Payer: COMMERCIAL

## 2020-03-11 ENCOUNTER — HOSPITAL ENCOUNTER (OUTPATIENT)
Facility: MEDICAL CENTER | Age: 65
End: 2020-03-11
Attending: NURSE PRACTITIONER
Payer: COMMERCIAL

## 2020-03-11 VITALS
SYSTOLIC BLOOD PRESSURE: 142 MMHG | RESPIRATION RATE: 16 BRPM | HEART RATE: 90 BPM | DIASTOLIC BLOOD PRESSURE: 80 MMHG | TEMPERATURE: 98.3 F | OXYGEN SATURATION: 93 %

## 2020-03-11 DIAGNOSIS — R05.9 COUGH: ICD-10-CM

## 2020-03-11 DIAGNOSIS — J22 LRTI (LOWER RESPIRATORY TRACT INFECTION): Primary | ICD-10-CM

## 2020-03-11 DIAGNOSIS — Z78.9 HISTORY OF RECENT TRAVEL: ICD-10-CM

## 2020-03-11 LAB
FLUAV+FLUBV AG SPEC QL IA: NEGATIVE
INT CON NEG: NEGATIVE
INT CON POS: POSITIVE

## 2020-03-11 PROCEDURE — 87581 M.PNEUMON DNA AMP PROBE: CPT

## 2020-03-11 PROCEDURE — 87804 INFLUENZA ASSAY W/OPTIC: CPT | Performed by: NURSE PRACTITIONER

## 2020-03-11 PROCEDURE — 87502 INFLUENZA DNA AMP PROBE: CPT

## 2020-03-11 PROCEDURE — 87633 RESP VIRUS 12-25 TARGETS: CPT

## 2020-03-11 PROCEDURE — 87486 CHLMYD PNEUM DNA AMP PROBE: CPT

## 2020-03-11 PROCEDURE — 99214 OFFICE O/P EST MOD 30 MIN: CPT | Performed by: NURSE PRACTITIONER

## 2020-03-11 RX ORDER — BENZONATATE 100 MG/1
CAPSULE ORAL
Qty: 40 CAP | Refills: 0 | Status: SHIPPED | OUTPATIENT
Start: 2020-03-11 | End: 2022-01-17

## 2020-03-11 RX ORDER — AMOXICILLIN AND CLAVULANATE POTASSIUM 875; 125 MG/1; MG/1
1 TABLET, FILM COATED ORAL 2 TIMES DAILY
Qty: 20 TAB | Refills: 0 | Status: SHIPPED | OUTPATIENT
Start: 2020-03-11 | End: 2020-03-21

## 2020-03-11 ASSESSMENT — ENCOUNTER SYMPTOMS
FEVER: 0
DIZZINESS: 0
FOCAL WEAKNESS: 0
SORE THROAT: 1
EYE REDNESS: 0
SHORTNESS OF BREATH: 0
HEMOPTYSIS: 0
CONSTIPATION: 0
NAUSEA: 0
PALPITATIONS: 0
NECK PAIN: 0
BACK PAIN: 0
DIARRHEA: 0
MYALGIAS: 0
CHILLS: 1
EYE PAIN: 0
EYE DISCHARGE: 0
SENSORY CHANGE: 0
TINGLING: 0
HEADACHES: 0
SPUTUM PRODUCTION: 1
ABDOMINAL PAIN: 0
WHEEZING: 0
VOMITING: 0
BLURRED VISION: 0
ORTHOPNEA: 0
BRUISES/BLEEDS EASILY: 0
COUGH: 1

## 2020-03-11 ASSESSMENT — LIFESTYLE VARIABLES: SUBSTANCE_ABUSE: 0

## 2020-03-11 NOTE — LETTER
March 11, 2020       Patient: Elizabeth Mendoza   YOB: 1955   Date of Visit: 3/11/2020         To Whom It May Concern:    It is my medical opinion that Elizabeth Mendoza return to full duty, no restrictions for 14 days or until Public Health Officer releases quarantine.              Sincerely,          KARINA Jennings  Electronically Signed

## 2020-03-11 NOTE — LETTER
March 11, 2020       Patient: Elizabeth Mendoza   YOB: 1955   Date of Visit: 3/11/2020         To Whom It May Concern:    It is my medical opinion that Elizabeth Mendoza return to full duty, no restrictions on 03/12/2020.              Sincerely,          KARINA Jennings  Electronically Signed

## 2020-03-12 DIAGNOSIS — R05.9 COUGH: ICD-10-CM

## 2020-03-12 DIAGNOSIS — Z78.9 HISTORY OF RECENT TRAVEL: ICD-10-CM

## 2020-03-12 DIAGNOSIS — J22 LRTI (LOWER RESPIRATORY TRACT INFECTION): ICD-10-CM

## 2020-03-12 LAB
C PNEUM DNA SPEC QL NAA+PROBE: NOT DETECTED
FLUAV H1 2009 PAND RNA SPEC QL NAA+PROBE: NOT DETECTED
FLUAV H1 RNA SPEC QL NAA+PROBE: NOT DETECTED
FLUAV H3 RNA SPEC QL NAA+PROBE: NOT DETECTED
FLUAV RNA SPEC QL NAA+PROBE: NEGATIVE
FLUAV RNA SPEC QL NAA+PROBE: NOT DETECTED
FLUBV RNA SPEC QL NAA+PROBE: NEGATIVE
FLUBV RNA SPEC QL NAA+PROBE: NOT DETECTED
HADV DNA SPEC QL NAA+PROBE: NOT DETECTED
HCOV RNA SPEC QL NAA+PROBE: NOT DETECTED
HMPV RNA SPEC QL NAA+PROBE: NOT DETECTED
HPIV1 RNA SPEC QL NAA+PROBE: NOT DETECTED
HPIV2 RNA SPEC QL NAA+PROBE: NOT DETECTED
HPIV3 RNA SPEC QL NAA+PROBE: NOT DETECTED
HPIV4 RNA SPEC QL NAA+PROBE: NOT DETECTED
M PNEUMO DNA SPEC QL NAA+PROBE: NOT DETECTED
RSV A RNA SPEC QL NAA+PROBE: NOT DETECTED
RSV B RNA SPEC QL NAA+PROBE: NOT DETECTED
RV+EV RNA SPEC QL NAA+PROBE: DETECTED

## 2020-03-12 NOTE — PROGRESS NOTES
"Subjective:      Elizabeth Mendoza is a 64 y.o. female who presents with No chief complaint on file.    Reviewed past medical, surgical and family history. Reviewed prescription and OTC medications with patient in electronic health record today    Reviewed past medical, surgical and family history. Reviewed prescription and OTC medications with patient in electronic health record today      Allergies   Allergen Reactions   • Biaxin [Clarithromycin] Vomiting   • Cipro Xr    • Pain Relief                HPI This is a new problem. C/o productive cough. She went to [place] for her sisters dental visit on  02/17/2020 ( > 14 days ago).  Has been treated for resp infection on 02/27/2020 with doxycycline and then again on 03/07/2020 for bronchitis with steroid juan. She continues to cough. Has not had a fever at all during this illness but has felt occ. Chilled.   Her cough improved after the initial antibiotics and steroids but has then returned.  Cough keeps 'changing\" t/o her illness. Feels mildly SOB w. exertion.  She quickly feels improved when she stops the activity. Sleeps on one pillow. Sleeps \" pretty well\" during the night. Wakes up coughing sometimes but can go back to sleep. She denies fevers or chills. Treatment tried: none.       Review of Systems   Constitutional: Positive for chills and malaise/fatigue. Negative for fever.   HENT: Positive for sore throat. Negative for ear pain.    Eyes: Negative for blurred vision, pain, discharge and redness.   Respiratory: Positive for cough and sputum production. Negative for hemoptysis, shortness of breath and wheezing.    Cardiovascular: Negative for chest pain, palpitations and orthopnea.   Gastrointestinal: Negative for abdominal pain, constipation, diarrhea, nausea and vomiting.   Genitourinary: Negative for dysuria.   Musculoskeletal: Negative for back pain, myalgias and neck pain.   Skin: Negative for rash.   Neurological: Negative for dizziness, tingling, " sensory change, focal weakness and headaches.   Endo/Heme/Allergies: Negative for environmental allergies. Does not bruise/bleed easily.   Psychiatric/Behavioral: Negative for substance abuse.          Objective:     VSS afebrile     Physical Exam  Vitals signs and nursing note reviewed.   Constitutional:       General: She is not in acute distress.     Appearance: Normal appearance. She is well-developed. She is not ill-appearing or toxic-appearing.   HENT:      Head: Normocephalic.      Right Ear: Hearing, ear canal and external ear normal. No middle ear effusion. Tympanic membrane is injected. Tympanic membrane is not erythematous.      Left Ear: Hearing, ear canal and external ear normal.  No middle ear effusion. Tympanic membrane is injected. Tympanic membrane is not erythematous.      Nose: No mucosal edema or rhinorrhea.      Right Sinus: No maxillary sinus tenderness or frontal sinus tenderness.      Left Sinus: No maxillary sinus tenderness or frontal sinus tenderness.      Mouth/Throat:      Pharynx: Uvula midline. No oropharyngeal exudate or posterior oropharyngeal erythema.      Tonsils: No tonsillar abscesses.   Eyes:      Pupils: Pupils are equal, round, and reactive to light.   Neck:      Musculoskeletal: Full passive range of motion without pain, normal range of motion and neck supple.      Trachea: Trachea normal.   Cardiovascular:      Rate and Rhythm: Normal rate and regular rhythm.      Chest Wall: PMI is not displaced.   Pulmonary:      Effort: Pulmonary effort is normal. No respiratory distress.      Breath sounds: Normal breath sounds. No decreased breath sounds, wheezing or rales. Rhonchi: scattered t/o lung fields that do not clear with cough.      Comments: Harsh bronchial cough    Abdominal:      Palpations: Abdomen is soft.      Tenderness: There is no abdominal tenderness.   Musculoskeletal: Normal range of motion.   Lymphadenopathy:      Cervical: No cervical adenopathy.      Upper  Body:      Right upper body: No supraclavicular adenopathy.      Left upper body: No supraclavicular adenopathy.   Skin:     General: Skin is warm and dry.   Neurological:      Mental Status: She is alert and oriented to person, place, and time.      Gait: Gait normal.   Psychiatric:         Speech: Speech normal.         Behavior: Behavior normal.          POCT influenza: negative        Assessment/Plan:     1. Cough  benzonatate (TESSALON) 100 MG Cap    POCT Influenza A/B    Influenza A/B By PCR (Adult - Flu Only)   2. LRTI (lower respiratory tract infection)  amoxicillin-clavulanate (AUGMENTIN) 875-125 MG Tab    REFERRAL TO FOLLOW-UP WITH PRIMARY CARE    POCT Influenza A/B    Influenza A/B By PCR (Adult - Flu Only)   3. History of recent travel  POCT Influenza A/B    Influenza A/B By PCR (Adult - Flu Only)       Since this patient became ill within 14 days of her trip to [place] at the time she was initially seen for a respiratory illness am putting her in quarantine and testing her per coronavirus protocol.  Patient was notified of her negative influenza point-of-care test.  She understands that she is on a 14-day home quarantine and agrees to this plan of care.  A note for her work was given to her.    Educated in proper administration of medication(s) ordered today including safety, possible SE, risks, benefits, rationale and alternatives to therapy.     Return to urgent care clinic or PCP prn if current symptoms are not resolving in a satisfactory manner or sooner if new or worsening symptoms occur.     Differential diagnosis, natural history, supportive care, and indications for immediate follow-up. Advised of signs and symptoms which would warrant further evaluation and /or emergent evaluation in ER.      Verbalized agreement with this treatment plan and seemed to understand without barriers. Questions were encouraged and answered to satisfaction.

## 2020-03-13 ENCOUNTER — TELEPHONE (OUTPATIENT)
Dept: URGENT CARE | Facility: PHYSICIAN GROUP | Age: 65
End: 2020-03-13

## 2020-03-14 NOTE — TELEPHONE ENCOUNTER
Notified of positive viral swab rhinovirus and enterovirus. Quarantine is lifted. Pt is still coughing. Starting to feel better would like to return to work on Wednesday March 18. Note written.   Resume all prior  RX medications. Take as prescribed.   Follow up prn new or worsening sx.

## 2020-03-31 ENCOUNTER — HOSPITAL ENCOUNTER (OUTPATIENT)
Facility: MEDICAL CENTER | Age: 65
End: 2020-03-31
Attending: PHYSICIAN ASSISTANT
Payer: COMMERCIAL

## 2020-03-31 ENCOUNTER — OFFICE VISIT (OUTPATIENT)
Dept: URGENT CARE | Facility: PHYSICIAN GROUP | Age: 65
End: 2020-03-31
Payer: COMMERCIAL

## 2020-03-31 VITALS
HEIGHT: 65 IN | WEIGHT: 293 LBS | TEMPERATURE: 97.6 F | DIASTOLIC BLOOD PRESSURE: 64 MMHG | OXYGEN SATURATION: 97 % | RESPIRATION RATE: 20 BRPM | SYSTOLIC BLOOD PRESSURE: 148 MMHG | HEART RATE: 124 BPM | BODY MASS INDEX: 48.82 KG/M2

## 2020-03-31 DIAGNOSIS — R30.0 DYSURIA: Primary | ICD-10-CM

## 2020-03-31 DIAGNOSIS — R30.0 DYSURIA: ICD-10-CM

## 2020-03-31 LAB
APPEARANCE UR: NORMAL
BILIRUB UR STRIP-MCNC: NORMAL MG/DL
COLOR UR AUTO: NORMAL
GLUCOSE UR STRIP.AUTO-MCNC: NORMAL MG/DL
KETONES UR STRIP.AUTO-MCNC: NORMAL MG/DL
LEUKOCYTE ESTERASE UR QL STRIP.AUTO: NORMAL
NITRITE UR QL STRIP.AUTO: POSITIVE
PH UR STRIP.AUTO: 7 [PH] (ref 5–8)
PROT UR QL STRIP: 100 MG/DL
RBC UR QL AUTO: NORMAL
SP GR UR STRIP.AUTO: 1.02
UROBILINOGEN UR STRIP-MCNC: 1 MG/DL

## 2020-03-31 PROCEDURE — 87186 SC STD MICRODIL/AGAR DIL: CPT

## 2020-03-31 PROCEDURE — 99214 OFFICE O/P EST MOD 30 MIN: CPT | Performed by: PHYSICIAN ASSISTANT

## 2020-03-31 PROCEDURE — 87077 CULTURE AEROBIC IDENTIFY: CPT

## 2020-03-31 PROCEDURE — 87086 URINE CULTURE/COLONY COUNT: CPT

## 2020-03-31 PROCEDURE — 81002 URINALYSIS NONAUTO W/O SCOPE: CPT | Performed by: PHYSICIAN ASSISTANT

## 2020-03-31 RX ORDER — SULFAMETHOXAZOLE AND TRIMETHOPRIM 800; 160 MG/1; MG/1
1 TABLET ORAL EVERY 12 HOURS
Qty: 14 TAB | Refills: 0 | Status: SHIPPED | OUTPATIENT
Start: 2020-03-31 | End: 2020-04-07

## 2020-03-31 RX ORDER — PHENAZOPYRIDINE HYDROCHLORIDE 200 MG/1
200 TABLET, FILM COATED ORAL 3 TIMES DAILY
Qty: 6 TAB | Refills: 0 | Status: SHIPPED | OUTPATIENT
Start: 2020-03-31 | End: 2020-04-02

## 2020-03-31 ASSESSMENT — FIBROSIS 4 INDEX: FIB4 SCORE: 0.98

## 2020-03-31 NOTE — PROGRESS NOTES
Subjective:      Pt is a 64 y.o. female who presents with UTI            HPI  This is a new problem. PT comes into the UC with a chief complaint of dysuria, burning on urination, urgency, frequency, and bladder pressure x 3 days. PT denies fevers or chills, CP, SOB, NVD, paresthesias, headaches, dizziness, change in vision, hives, or joint pain. PT states the pain is a 6/10 with burning upon urination, aching in nature and worse at night. Pt states they have not taken any RX meds for this issue. Pt denies flank or back pain as well. The pt's medication list, problem list, and allergies have been evaluated and reviewed during today's visit.      PMH:  Past Medical History:   Diagnosis Date   • Anxiety 2016   • Bronchitis    • Cholesterol blood decreased    • Cough 2014   • Diverticulitis    • Heart burn    • Hyperlipidemia    • Hypertension    • Hypothyroid    • Other specified disorder of intestines    • Viral upper respiratory tract infection 2017   • Vitamin D deficiency 2016       PSH:  Past Surgical History:   Procedure Laterality Date   • VENTRAL HERNIA REPAIR  3/31/2014    Performed by John H Ganser, M.D. at [facility] ORS   • LUMPECTOMY      benignleft breast lump removed   • COLON RESECTION     • APPENDECTOMY     • GERRY BY LAPAROSCOPY     • CYST EXCISION      b/l breast   • TUBAL LIGATION      right oophorectomy   • TONSILLECTOMY     • COLONOSCOPY  2006/2014    x 2       Fam Hx:  Father alive and well with no major medical issues  Mother alive and well with no major medical  Issues  family history includes Diabetes in an other family member; Heart Disease (age of onset: 55) in her father; Hypertension in her mother; Stroke in her mother.  Family Status   Relation Name Status   • Mo  Alive   • Fa     • OTHER  (Not Specified)       Soc HX:  Social History     Socioeconomic History   • Marital status:      Spouse name: Not on file   •  Number of children: Not on file   • Years of education: Not on file   • Highest education level: Not on file   Occupational History   • Not on file   Social Needs   • Financial resource strain: Not on file   • Food insecurity     Worry: Not on file     Inability: Not on file   • Transportation needs     Medical: Not on file     Non-medical: Not on file   Tobacco Use   • Smoking status: Never Smoker   • Smokeless tobacco: Never Used   Substance and Sexual Activity   • Alcohol use: No     Alcohol/week: 0.0 oz   • Drug use: No   • Sexual activity: Yes     Partners: Male   Lifestyle   • Physical activity     Days per week: Not on file     Minutes per session: Not on file   • Stress: Not on file   Relationships   • Social connections     Talks on phone: Not on file     Gets together: Not on file     Attends Confucianist service: Not on file     Active member of club or organization: Not on file     Attends meetings of clubs or organizations: Not on file     Relationship status: Not on file   • Intimate partner violence     Fear of current or ex partner: Not on file     Emotionally abused: Not on file     Physically abused: Not on file     Forced sexual activity: Not on file   Other Topics Concern   • Not on file   Social History Narrative   • Not on file         Medications:    Current Outpatient Medications:   •  sulfamethoxazole-trimethoprim (BACTRIM DS) 800-160 MG tablet, Take 1 Tab by mouth every 12 hours for 7 days., Disp: 14 Tab, Rfl: 0  •  phenazopyridine (PYRIDIUM) 200 MG Tab, Take 1 Tab by mouth 3 times a day for 2 days., Disp: 6 Tab, Rfl: 0  •  irbesartan (AVAPRO) 300 MG Tab, TAKE 1/2 TABLET (150MG) BY MOUTH EVERY DAY, Disp: 45 Tab, Rfl: 3  •  montelukast (SINGULAIR) 10 MG Tab, TAKE 1 TAB BY MOUTH 1 TIME DAILY AS NEEDED., Disp: 30 Tab, Rfl: 5  •  enalapril (VASOTEC) 10 MG Tab, Take 1 Tab by mouth every day., Disp: 30 Tab, Rfl: 5  •  levothyroxine (SYNTHROID) 125 MCG Tab, Take 1 Tab by mouth every morning before  breakfast., Disp: 90 Tab, Rfl: 3  •  simvastatin (ZOCOR) 20 MG Tab, Take 1 Tab by mouth every evening., Disp: 90 Tab, Rfl: 3  •  triamterene/hctz (MAXZIDE-25/DYAZIDE) 37.5-25 MG Cap, Take 1 Cap by mouth every day., Disp: 90 Cap, Rfl: 3  •  alprazolam (XANAX) 0.5 MG Tab, Take 1 Tab by mouth at bedtime as needed., Disp: 30 Tab, Rfl: 0  •  Famotidine (PEPCID PO), Take  by mouth., Disp: , Rfl:   •  cholecalciferol (VITAMIN D3) 5000 UNIT Cap, Take 1 Cap by mouth every day., Disp: 90 Cap, Rfl: 3  •  albuterol (VENTOLIN OR PROVENTIL) 108 (90 BASE) MCG/ACT Aero Soln inhalation aerosol, Inhale 2 Puffs by mouth every 6 hours as needed for Shortness of Breath., Disp: 8.5 g, Rfl: 1  •  Calcium Polycarbophil (FIBERCON PO), Take  by mouth 2 Times a Day. Takes two, Disp: , Rfl:   •  aspirin (ASA) 325 MG TABS, Take 325 mg by mouth every 6 hours as needed., Disp: , Rfl:   •  omeprazole (PRILOSEC) 20 MG CPDR, Take 20 mg by mouth every day., Disp: , Rfl:   •  benzonatate (TESSALON) 100 MG Cap, 1-2 caps PO TID prn cough, Disp: 40 Cap, Rfl: 0  •  promethazine-dextromethorphan (PROMETHAZINE-DM) 6.25-15 MG/5ML syrup, Take 5 mL by mouth every four hours as needed for Cough., Disp: 120 mL, Rfl: 0      Allergies:  Biaxin [clarithromycin]; Cipro xr; and Pain relief    ROS    Review of Systems   Constitutional: Negative for fever, chills and malaise/fatigue.   HENT: Negative for congestion and sore throat.    Eyes: Negative for blurred vision, double vision and photophobia.   Respiratory: Negative for cough and shortness of breath.    Cardiovascular: Negative for chest pain and palpitations.   Gastrointestinal: Negative for nausea, vomiting, abdominal pain, diarrhea and constipation.   Genitourinary: Positive for dysuria, urgency and frequency.   Musculoskeletal: Negative for joint pain and myalgias.   Skin: Negative for rash.   Neurological: Negative for dizziness, tingling and headaches.   Endo/Heme/Allergies: Does not bruise/bleed easily.  "  Psychiatric/Behavioral: Negative for depression. The patient is not nervous/anxious.         Objective:     /64 (BP Location: Right arm, Patient Position: Sitting, BP Cuff Size: Large adult)   Pulse (!) 124   Temp 36.4 °C (97.6 °F) (Temporal)   Resp 20   Ht 1.651 m (5' 5\")   Wt (!) 135.2 kg (298 lb)   SpO2 97%   BMI 49.59 kg/m²      Physical Exam      Physical Exam   Constitutional: She is oriented to person, place, and time. She appears well-developed and well-nourished. No distress.   HENT:   Head: Normocephalic and atraumatic.   Right Ear: External ear normal.   Left Ear: External ear normal.   Nose: Nose normal.   Mouth/Throat: Oropharynx is clear and moist. No oropharyngeal exudate.   Eyes: Conjunctivae normal and EOM are normal. Pupils are equal, round, and reactive to light.   Neck: Normal range of motion. Neck supple. No thyromegaly present.   Cardiovascular: Normal rate, regular rhythm, normal heart sounds and intact distal pulses.  Exam reveals no gallop and no friction rub.    No murmur heard.  Pulmonary/Chest: Effort normal and breath sounds normal. No respiratory distress. She has no wheezes. She has no rales. She exhibits no tenderness.   Abdominal: Soft. Bowel sounds are normal. She exhibits no distension and no mass. There is no tenderness. There is no rebound and no guarding.   Genitourinary:        Pt deferred   Musculoskeletal: Normal range of motion. She exhibits no edema and no tenderness.   Lymphadenopathy:     She has no cervical adenopathy.   Neurological: She is alert and oriented to person, place, and time. She has normal reflexes. No cranial nerve deficit.   Skin: Skin is warm and dry. No rash noted. No erythema.   Psychiatric: She has a normal mood and affect. Her behavior is normal. Judgment and thought content normal.          Assessment/Plan:       1. Dysuria    - POCT Urinalysis-->LEUKS< NITRITES< BLOOD  - Urine Culture; Future  - sulfamethoxazole-trimethoprim (BACTRIM " DS) 800-160 MG tablet; Take 1 Tab by mouth every 12 hours for 7 days.  Dispense: 14 Tab; Refill: 0  - phenazopyridine (PYRIDIUM) 200 MG Tab; Take 1 Tab by mouth 3 times a day for 2 days.  Dispense: 6 Tab; Refill: 0      Rest, fluids encouraged.  AVS with medical info given.  Pt was in full understanding and agreement with the plan.  Differential diagnosis, natural history, supportive care, and indications for immediate follow-up discussed. All questions answered. Patient agrees with the plan of care.  Follow-up as needed if symptoms worsen or fail to improve to PCP, Urgent care or Emergency Room.

## 2020-04-02 ENCOUNTER — TELEPHONE (OUTPATIENT)
Dept: URGENT CARE | Facility: CLINIC | Age: 65
End: 2020-04-02

## 2020-04-02 LAB
BACTERIA UR CULT: ABNORMAL
BACTERIA UR CULT: ABNORMAL
SIGNIFICANT IND 70042: ABNORMAL
SITE SITE: ABNORMAL
SOURCE SOURCE: ABNORMAL

## 2020-04-02 NOTE — TELEPHONE ENCOUNTER
Called and left message with pt about urine culture results which came back positive for E.coli.   I told her she could continue the abx therapy with a positive urine culture which was sensitive to the abx she was placed on.  Encouraged Pt to call back with questions.  Soren Laughlin PA-C

## 2020-05-01 ENCOUNTER — HOSPITAL ENCOUNTER (OUTPATIENT)
Dept: LAB | Facility: MEDICAL CENTER | Age: 65
End: 2020-05-01
Attending: INTERNAL MEDICINE
Payer: COMMERCIAL

## 2020-05-01 DIAGNOSIS — E78.00 PURE HYPERCHOLESTEROLEMIA: ICD-10-CM

## 2020-05-01 DIAGNOSIS — E03.4 HYPOTHYROIDISM DUE TO ACQUIRED ATROPHY OF THYROID: ICD-10-CM

## 2020-05-01 DIAGNOSIS — I10 ESSENTIAL HYPERTENSION: ICD-10-CM

## 2020-05-01 LAB
ALBUMIN SERPL BCP-MCNC: 4.5 G/DL (ref 3.2–4.9)
ALBUMIN/GLOB SERPL: 1.5 G/DL
ALP SERPL-CCNC: 57 U/L (ref 30–99)
ALT SERPL-CCNC: 21 U/L (ref 2–50)
ANION GAP SERPL CALC-SCNC: 16 MMOL/L (ref 7–16)
AST SERPL-CCNC: 19 U/L (ref 12–45)
BASOPHILS # BLD AUTO: 0.9 % (ref 0–1.8)
BASOPHILS # BLD: 0.06 K/UL (ref 0–0.12)
BILIRUB SERPL-MCNC: 0.5 MG/DL (ref 0.1–1.5)
BUN SERPL-MCNC: 17 MG/DL (ref 8–22)
CALCIUM SERPL-MCNC: 9.5 MG/DL (ref 8.5–10.5)
CHLORIDE SERPL-SCNC: 99 MMOL/L (ref 96–112)
CHOLEST SERPL-MCNC: 192 MG/DL (ref 100–199)
CO2 SERPL-SCNC: 26 MMOL/L (ref 20–33)
CREAT SERPL-MCNC: 0.8 MG/DL (ref 0.5–1.4)
EOSINOPHIL # BLD AUTO: 0.32 K/UL (ref 0–0.51)
EOSINOPHIL NFR BLD: 4.8 % (ref 0–6.9)
ERYTHROCYTE [DISTWIDTH] IN BLOOD BY AUTOMATED COUNT: 45.7 FL (ref 35.9–50)
FASTING STATUS PATIENT QL REPORTED: NORMAL
GLOBULIN SER CALC-MCNC: 3.1 G/DL (ref 1.9–3.5)
GLUCOSE SERPL-MCNC: 109 MG/DL (ref 65–99)
HCT VFR BLD AUTO: 49.1 % (ref 37–47)
HDLC SERPL-MCNC: 49 MG/DL
HGB BLD-MCNC: 15.4 G/DL (ref 12–16)
IMM GRANULOCYTES # BLD AUTO: 0.01 K/UL (ref 0–0.11)
IMM GRANULOCYTES NFR BLD AUTO: 0.1 % (ref 0–0.9)
LDLC SERPL CALC-MCNC: 102 MG/DL
LYMPHOCYTES # BLD AUTO: 1.74 K/UL (ref 1–4.8)
LYMPHOCYTES NFR BLD: 26 % (ref 22–41)
MCH RBC QN AUTO: 28.5 PG (ref 27–33)
MCHC RBC AUTO-ENTMCNC: 31.4 G/DL (ref 33.6–35)
MCV RBC AUTO: 90.9 FL (ref 81.4–97.8)
MONOCYTES # BLD AUTO: 0.65 K/UL (ref 0–0.85)
MONOCYTES NFR BLD AUTO: 9.7 % (ref 0–13.4)
NEUTROPHILS # BLD AUTO: 3.92 K/UL (ref 2–7.15)
NEUTROPHILS NFR BLD: 58.5 % (ref 44–72)
NRBC # BLD AUTO: 0 K/UL
NRBC BLD-RTO: 0 /100 WBC
PLATELET # BLD AUTO: 286 K/UL (ref 164–446)
PMV BLD AUTO: 10.1 FL (ref 9–12.9)
POTASSIUM SERPL-SCNC: 4.1 MMOL/L (ref 3.6–5.5)
PROT SERPL-MCNC: 7.6 G/DL (ref 6–8.2)
RBC # BLD AUTO: 5.4 M/UL (ref 4.2–5.4)
SODIUM SERPL-SCNC: 141 MMOL/L (ref 135–145)
TRIGL SERPL-MCNC: 203 MG/DL (ref 0–149)
TSH SERPL DL<=0.005 MIU/L-ACNC: 4.05 UIU/ML (ref 0.38–5.33)
WBC # BLD AUTO: 6.7 K/UL (ref 4.8–10.8)

## 2020-05-01 PROCEDURE — 80061 LIPID PANEL: CPT

## 2020-05-01 PROCEDURE — 80053 COMPREHEN METABOLIC PANEL: CPT

## 2020-05-01 PROCEDURE — 36415 COLL VENOUS BLD VENIPUNCTURE: CPT

## 2020-05-01 PROCEDURE — 84443 ASSAY THYROID STIM HORMONE: CPT

## 2020-05-01 PROCEDURE — 85025 COMPLETE CBC W/AUTO DIFF WBC: CPT

## 2020-05-04 ENCOUNTER — OFFICE VISIT (OUTPATIENT)
Dept: MEDICAL GROUP | Facility: PHYSICIAN GROUP | Age: 65
End: 2020-05-04
Payer: COMMERCIAL

## 2020-05-04 VITALS
HEART RATE: 99 BPM | RESPIRATION RATE: 16 BRPM | TEMPERATURE: 99.2 F | SYSTOLIC BLOOD PRESSURE: 134 MMHG | BODY MASS INDEX: 48.82 KG/M2 | HEIGHT: 65 IN | WEIGHT: 293 LBS | OXYGEN SATURATION: 95 % | DIASTOLIC BLOOD PRESSURE: 82 MMHG

## 2020-05-04 DIAGNOSIS — Z12.31 ENCOUNTER FOR SCREENING MAMMOGRAM FOR BREAST CANCER: ICD-10-CM

## 2020-05-04 DIAGNOSIS — J45.20 MILD INTERMITTENT REACTIVE AIRWAY DISEASE WITHOUT COMPLICATION: ICD-10-CM

## 2020-05-04 DIAGNOSIS — R73.01 FASTING HYPERGLYCEMIA: ICD-10-CM

## 2020-05-04 DIAGNOSIS — R10.30 LOWER ABDOMINAL PAIN: ICD-10-CM

## 2020-05-04 DIAGNOSIS — M54.50 LUMBAR PAIN: ICD-10-CM

## 2020-05-04 DIAGNOSIS — I10 ESSENTIAL HYPERTENSION: ICD-10-CM

## 2020-05-04 DIAGNOSIS — E66.01 CLASS 3 SEVERE OBESITY DUE TO EXCESS CALORIES WITHOUT SERIOUS COMORBIDITY WITH BODY MASS INDEX (BMI) OF 50.0 TO 59.9 IN ADULT (HCC): ICD-10-CM

## 2020-05-04 PROCEDURE — 99214 OFFICE O/P EST MOD 30 MIN: CPT | Performed by: INTERNAL MEDICINE

## 2020-05-04 RX ORDER — CELECOXIB 100 MG/1
100 CAPSULE ORAL 2 TIMES DAILY
Qty: 60 CAP | Refills: 3 | Status: SHIPPED | OUTPATIENT
Start: 2020-05-04 | End: 2021-01-26

## 2020-05-04 RX ORDER — FUROSEMIDE 20 MG/1
20 TABLET ORAL DAILY
Qty: 30 TAB | Refills: 3 | Status: SHIPPED | OUTPATIENT
Start: 2020-05-04 | End: 2020-07-29

## 2020-05-04 RX ORDER — IBUPROFEN 600 MG/1
600 TABLET ORAL EVERY 6 HOURS PRN
Qty: 90 TAB | Refills: 3 | Status: SHIPPED | OUTPATIENT
Start: 2020-05-04 | End: 2020-07-29

## 2020-05-04 RX ORDER — POTASSIUM CHLORIDE 20 MEQ/1
20 TABLET, EXTENDED RELEASE ORAL DAILY
Qty: 30 TAB | Refills: 3 | Status: SHIPPED | OUTPATIENT
Start: 2020-05-04 | End: 2020-08-24 | Stop reason: SDUPTHER

## 2020-05-04 ASSESSMENT — PATIENT HEALTH QUESTIONNAIRE - PHQ9: CLINICAL INTERPRETATION OF PHQ2 SCORE: 0

## 2020-05-04 ASSESSMENT — FIBROSIS 4 INDEX: FIB4 SCORE: 0.93

## 2020-05-04 NOTE — ASSESSMENT & PLAN NOTE
Patient reports she currently doesn't feel she has much of a cough.  She has some allergies but generally ffeels her cough is good.  She is earnest but has trouble relating when the cough improved.

## 2020-05-04 NOTE — PROGRESS NOTES
Chief Complaint   Patient presents with   • Hypertension     FV   • Hyperlipidemia     FV   • Edema       HISTORY OF PRESENT ILLNESS: Patient is a 64 y.o. female established patient who presents today to discuss the medical issues below.    Hypertension  Patient has reports she is having more problem for the last few weeks some increse ankle swelling.  She is taking dyazide daily.  She has changed the enalapril to the Irbesartan.  She reports she takes 1/2 of the 300 mg irbesrtan.  Home readings in the 130-140/ 80 range.      Lower abdominal pain  Patient is concerned about a pain at the lower abdomen.  In am awakens fine, as the day progresses she develops pain at the lower abdomen.  She feels the bones at the lower abdomen pelvis ache, she states this is severe enough she can barely walk by the end of the day.  No constipation or diarrhea.  She reports she has felt she had reoccurance of diverticular disease but not the same type of pain. Getting up and stretching helps.  She describes as at the bottom of her pelvis, no radiation, no trauma although she has some chronic low back pain.  No LE numbness tingling or weakness.  She has tried some ibuprofen and that helps.  Estimates 600 mg 1 x a day.  Has had 1 UTI but currently denies dysuria urgency or hesitancy.  No bleeding, last period over 10 years.  She is able to report she is sitting more.  She is planning on retiring in the next few mos.  She limits activity due to bilateral hip pain, indicates lateral superior pelvis.  She is dyspnic with the masks with Covid.      Reactive airway disease without complication  Patient reports she currently doesn't feel she has much of a cough.  She has some allergies but generally ffeels her cough is good.  She is earnest but has trouble relating when the cough improved.        Patient Active Problem List    Diagnosis Date Noted   • Obesity 03/06/2013     Priority: Medium   • Lower abdominal pain 05/04/2020   • Morbid  obesity with BMI of 45.0-49.9, adult ([facility]) 11/22/2019   • Viral upper respiratory tract infection 01/04/2017   • Anxiety 02/24/2016   • Vitamin D deficiency 02/23/2016   • Reactive airway disease without complication 11/12/2014   • Incisional hernia 03/31/2014   • Hypertension    • Hyperlipidemia    • Hypothyroid        Allergies:Biaxin [clarithromycin]; Cipro xr; and Pain relief    Current Outpatient Medications   Medication Sig Dispense Refill   • celecoxib (CELEBREX) 100 MG Cap Take 1 Cap by mouth 2 times a day. 60 Cap 3   • furosemide (LASIX) 20 MG Tab Take 1 Tab by mouth every day. 30 Tab 3   • potassium chloride SA (KDUR) 20 MEQ Tab CR Take 1 Tab by mouth every day. 30 Tab 3   • ibuprofen (MOTRIN) 600 MG Tab Take 1 Tab by mouth every 6 hours as needed. 90 Tab 3   • benzonatate (TESSALON) 100 MG Cap 1-2 caps PO TID prn cough 40 Cap 0   • irbesartan (AVAPRO) 300 MG Tab TAKE 1/2 TABLET (150MG) BY MOUTH EVERY DAY 45 Tab 3   • levothyroxine (SYNTHROID) 125 MCG Tab Take 1 Tab by mouth every morning before breakfast. 90 Tab 3   • simvastatin (ZOCOR) 20 MG Tab Take 1 Tab by mouth every evening. 90 Tab 3   • triamterene/hctz (MAXZIDE-25/DYAZIDE) 37.5-25 MG Cap Take 1 Cap by mouth every day. 90 Cap 3   • alprazolam (XANAX) 0.5 MG Tab Take 1 Tab by mouth at bedtime as needed. 30 Tab 0   • Famotidine (PEPCID PO) Take  by mouth.     • cholecalciferol (VITAMIN D3) 5000 UNIT Cap Take 1 Cap by mouth every day. 90 Cap 3   • albuterol (VENTOLIN OR PROVENTIL) 108 (90 BASE) MCG/ACT Aero Soln inhalation aerosol Inhale 2 Puffs by mouth every 6 hours as needed for Shortness of Breath. 8.5 g 1   • Calcium Polycarbophil (FIBERCON PO) Take  by mouth 2 Times a Day. Takes two     • aspirin (ASA) 325 MG TABS Take 325 mg by mouth every 6 hours as needed.     • omeprazole (PRILOSEC) 20 MG CPDR Take 20 mg by mouth every day.     • montelukast (SINGULAIR) 10 MG Tab TAKE 1 TAB BY MOUTH 1 TIME DAILY AS NEEDED. 30 Tab 5     No current  "facility-administered medications for this visit.          Past Medical History:   Diagnosis Date   • Anxiety 2016   • Bronchitis    • Cholesterol blood decreased    • Cough 2014   • Diverticulitis    • Heart burn    • Hyperlipidemia    • Hypertension    • Hypothyroid    • Other specified disorder of intestines    • Viral upper respiratory tract infection 2017   • Vitamin D deficiency 2016       Social History     Tobacco Use   • Smoking status: Never Smoker   • Smokeless tobacco: Never Used   Substance Use Topics   • Alcohol use: No     Alcohol/week: 0.0 oz   • Drug use: No       Family Status   Relation Name Status   • Mo  Alive   • Fa     • OTHER  (Not Specified)     Family History   Problem Relation Age of Onset   • Stroke Mother    • Hypertension Mother    • Heart Disease Father 55   • Diabetes Other        ROS:    Respiratory: Negative for cough, sputum production,    Cardiovascular: Negative for chest pain, palpitations, orthopnea, dyspnea with exertion or edema.   Gastrointestinal: Negative for GI upset, nausea, vomiting, abdominal pain, constipation or diarrhea.   Genitourinary: Negative for dysuria, urgency, hesitancy or frequency.       Exam:    /82   Pulse 99   Temp 37.3 °C (99.2 °F) (Temporal)   Resp 16   Ht 1.651 m (5' 5\")   Wt (!) 136.5 kg (301 lb)   SpO2 95%  Body mass index is 50.09 kg/m².  General: Obese female in no apparent distress  HENT: Normocephalic, bilateral TMs are intact, nasal and oral mucosa with no lesions,   Spine: No vertebral or vertebral body tenderness no flank tenderness to percussion negative straight leg testing.  Pulmonary: Clear to ausculation and percussion.  Normal effort. No rales, rhonchi, or wheezing.  Cardiovascular: Regular rate and rhythm without murmur.   Abdomen: Normal bowel sounds soft minimally tender at the left lower quadrant underneath the panniculus.  No localization no rebound.  No suprapubic tenderness  Extremities: " Trace to 1+ pitting edema bilaterally  Neuro: Grossly nonfocal.  Psych: Alert and oriented to person, place, and time. Appropriate mood and conversation.    LABS: Results reviewed and discussed with the patient, questions answered.      This dictation was created using voice recognition software. I have made reasonable attempts to correct errors, however, errors of grammar and content may exist.          Assessment/Plan:    1. Encounter for screening mammogram for breast cancer    - MA-SCREENING MAMMO BILAT W/CAD; Future    2. Essential hypertension  Blood pressure well controlled considering the edema will change the triamterene hydrochlorothiazide to Lasix and potassium.  Discussed low-salt diet.  Implication of obesity discussed  - Basic Metabolic Panel; Future    3. Lower abdominal pain  No clear etiology considering that it improves when she gets up and stretches and some ibuprofen.  Will monitor with heat stretching, discussed trial change to Celebrex she is concerned about cost.  If that is prohibitive will utilize ibuprofen 600 mg 3 times daily with food.  Checking the lumbar x-ray.  Offered physical therapy she declines for now.  Implication of the obesity discussed.  Cannot entirely exclude a diverticular or occult problem monitor with conservative management signs and symptoms of concern discussed with the patient prompting early follow-up or ER.  - DX-LUMBAR SPINE-2 OR 3 VIEWS; Future    4. Mild intermittent reactive airway disease without complication  Good air motion today continues to decline sleep studies    5. Class 3 severe obesity due to excess calories without serious comorbidity with body mass index (BMI) of 50.0 to 59.9 in adult (HCC)  Long discussion held regarding persistence of obesity underlying issues.  She is reticent to consider referral to health improvement programs support given referral placed  - [facility] Services Requested: Physician  Medical Weight Management Program; Reason for Referral? BMI>30; Reason for Visit: Overweight/Obesity, BMI of 25 or higher and Fasting Glucose 100-125    6. Lumbar pain  As discussed above  - DX-LUMBAR SPINE-2 OR 3 VIEWS; Future    7. Fasting hyperglycemia  Implication of the borderline high blood sugar in the obesity discussed will obtain a A1c and especially for referral to improvement program however, implications of prediabetes discussed consideration for medications pending labs  - HEMOGLOBIN A1C; Future       Patient was seen for 25 minutes face to face of which more than 50% of the time was spent in counseling and coordination of care regarding the above problems.

## 2020-05-04 NOTE — ASSESSMENT & PLAN NOTE
Patient is concerned about a pain at the lower abdomen.  In am awakens fine, as the day progresses she develops pain at the lower abdomen.  She feels the bones at the lower abdomen pelvis ache, she states this is severe enough she can barely walk by the end of the day.  No constipation or diarrhea.  She reports she has felt she had reoccurance of diverticular disease but not the same type of pain. Getting up and stretching helps.  She describes as at the bottom of her pelvis, no radiation, no trauma although she has some chronic low back pain.  No LE numbness tingling or weakness.  She has tried some ibuprofen and that helps.  Estimates 600 mg 1 x a day.  Has had 1 UTI but currently denies dysuria urgency or hesitancy.  No bleeding, last period over 10 years.  She is able to report she is sitting more.  She is planning on retiring in the next few mos.  She limits activity due to bilateral hip pain, indicates lateral superior pelvis.  She is dyspnic with the masks with Covid.

## 2020-05-04 NOTE — ASSESSMENT & PLAN NOTE
Patient has reports she is having more problem for the last few weeks some increse ankle swelling.  She is taking dyazide daily.  She has changed the enalapril to the Irbesartan.  She reports she takes 1/2 of the 300 mg irbesrtan.  Home readings in the 130-140/ 80 range.

## 2020-05-05 ENCOUNTER — APPOINTMENT (OUTPATIENT)
Dept: URGENT CARE | Facility: PHYSICIAN GROUP | Age: 65
End: 2020-05-05
Payer: COMMERCIAL

## 2020-05-05 ENCOUNTER — APPOINTMENT (OUTPATIENT)
Dept: RADIOLOGY | Facility: IMAGING CENTER | Age: 65
End: 2020-05-05
Attending: INTERNAL MEDICINE
Payer: COMMERCIAL

## 2020-05-05 DIAGNOSIS — M54.50 LUMBAR PAIN: ICD-10-CM

## 2020-05-05 DIAGNOSIS — R10.30 LOWER ABDOMINAL PAIN: ICD-10-CM

## 2020-05-05 PROCEDURE — 72100 X-RAY EXAM L-S SPINE 2/3 VWS: CPT | Mod: TC | Performed by: FAMILY MEDICINE

## 2020-05-12 ENCOUNTER — PATIENT MESSAGE (OUTPATIENT)
Dept: MEDICAL GROUP | Facility: PHYSICIAN GROUP | Age: 65
End: 2020-05-12

## 2020-06-26 ENCOUNTER — HOSPITAL ENCOUNTER (OUTPATIENT)
Dept: LAB | Facility: MEDICAL CENTER | Age: 65
End: 2020-06-26
Attending: INTERNAL MEDICINE
Payer: COMMERCIAL

## 2020-06-26 DIAGNOSIS — I10 ESSENTIAL HYPERTENSION: ICD-10-CM

## 2020-06-26 DIAGNOSIS — R73.01 FASTING HYPERGLYCEMIA: ICD-10-CM

## 2020-06-26 LAB
ANION GAP SERPL CALC-SCNC: 15 MMOL/L (ref 7–16)
BUN SERPL-MCNC: 21 MG/DL (ref 8–22)
CALCIUM SERPL-MCNC: 9.5 MG/DL (ref 8.5–10.5)
CHLORIDE SERPL-SCNC: 101 MMOL/L (ref 96–112)
CO2 SERPL-SCNC: 23 MMOL/L (ref 20–33)
CREAT SERPL-MCNC: 0.8 MG/DL (ref 0.5–1.4)
EST. AVERAGE GLUCOSE BLD GHB EST-MCNC: 126 MG/DL
FASTING STATUS PATIENT QL REPORTED: NORMAL
GLUCOSE SERPL-MCNC: 94 MG/DL (ref 65–99)
HBA1C MFR BLD: 6 % (ref 0–5.6)
POTASSIUM SERPL-SCNC: 4 MMOL/L (ref 3.6–5.5)
SODIUM SERPL-SCNC: 139 MMOL/L (ref 135–145)

## 2020-06-26 PROCEDURE — 83036 HEMOGLOBIN GLYCOSYLATED A1C: CPT

## 2020-06-26 PROCEDURE — 36415 COLL VENOUS BLD VENIPUNCTURE: CPT

## 2020-06-26 PROCEDURE — 80048 BASIC METABOLIC PNL TOTAL CA: CPT

## 2020-07-06 ENCOUNTER — OFFICE VISIT (OUTPATIENT)
Dept: MEDICAL GROUP | Facility: PHYSICIAN GROUP | Age: 65
End: 2020-07-06
Payer: COMMERCIAL

## 2020-07-06 VITALS
HEIGHT: 65 IN | SYSTOLIC BLOOD PRESSURE: 138 MMHG | TEMPERATURE: 98.5 F | OXYGEN SATURATION: 95 % | WEIGHT: 293 LBS | HEART RATE: 100 BPM | BODY MASS INDEX: 48.82 KG/M2 | DIASTOLIC BLOOD PRESSURE: 82 MMHG

## 2020-07-06 DIAGNOSIS — E03.4 HYPOTHYROIDISM DUE TO ACQUIRED ATROPHY OF THYROID: ICD-10-CM

## 2020-07-06 DIAGNOSIS — R10.30 LOWER ABDOMINAL PAIN: ICD-10-CM

## 2020-07-06 DIAGNOSIS — E78.00 PURE HYPERCHOLESTEROLEMIA: ICD-10-CM

## 2020-07-06 DIAGNOSIS — E66.01 CLASS 3 SEVERE OBESITY DUE TO EXCESS CALORIES WITHOUT SERIOUS COMORBIDITY WITH BODY MASS INDEX (BMI) OF 50.0 TO 59.9 IN ADULT (HCC): ICD-10-CM

## 2020-07-06 DIAGNOSIS — R73.01 FASTING HYPERGLYCEMIA: ICD-10-CM

## 2020-07-06 DIAGNOSIS — I10 ESSENTIAL HYPERTENSION: ICD-10-CM

## 2020-07-06 DIAGNOSIS — F41.9 ANXIETY: ICD-10-CM

## 2020-07-06 DIAGNOSIS — E66.01 MORBID OBESITY WITH BMI OF 45.0-49.9, ADULT (HCC): ICD-10-CM

## 2020-07-06 DIAGNOSIS — E55.9 VITAMIN D DEFICIENCY: ICD-10-CM

## 2020-07-06 PROCEDURE — 99214 OFFICE O/P EST MOD 30 MIN: CPT | Performed by: INTERNAL MEDICINE

## 2020-07-06 ASSESSMENT — FIBROSIS 4 INDEX: FIB4 SCORE: 0.94

## 2020-07-06 NOTE — PROGRESS NOTES
Chief Complaint   Patient presents with   • Hypertension   • Results     labs        HISTORY OF PRESENT ILLNESS: Patient is a 65 y.o. female established patient who presents today to discuss the medical issues below.    Morbid obesity with BMI of 45.0-49.9, adult (Formerly [facility])  Weight is down a bit.      Hypertension  Patient has cut out the salt.  She is on meds,     Anxiety  Patient reports she is doing well.  Utilizing PRN Xanax but does not require prescription.  States this is fairly rare.    Obesity  Continues to work on diet has switched from so many carbs to fruits and vegetables.    Lower abdominal pain  Patient indicates her abdomen pain comes and goes she associates it with constipation improves with Metamucil fiber therapy.      Patient Active Problem List    Diagnosis Date Noted   • Obesity 03/06/2013     Priority: Medium   • Lower abdominal pain 05/04/2020   • Morbid obesity with BMI of 45.0-49.9, adult (Formerly [facility]) 11/22/2019   • Viral upper respiratory tract infection 01/04/2017   • Anxiety 02/24/2016   • Vitamin D deficiency 02/23/2016   • Reactive airway disease without complication 11/12/2014   • Incisional hernia 03/31/2014   • Hypertension    • Hyperlipidemia    • Hypothyroid        Allergies:Biaxin [clarithromycin]; Cipro xr; and Pain relief    Current Outpatient Medications   Medication Sig Dispense Refill   • furosemide (LASIX) 20 MG Tab Take 1 Tab by mouth every day. 30 Tab 3   • potassium chloride SA (KDUR) 20 MEQ Tab CR Take 1 Tab by mouth every day. 30 Tab 3   • ibuprofen (MOTRIN) 600 MG Tab Take 1 Tab by mouth every 6 hours as needed. 90 Tab 3   • benzonatate (TESSALON) 100 MG Cap 1-2 caps PO TID prn cough 40 Cap 0   • irbesartan (AVAPRO) 300 MG Tab TAKE 1/2 TABLET (150MG) BY MOUTH EVERY DAY 45 Tab 3   • montelukast (SINGULAIR) 10 MG Tab TAKE 1 TAB BY MOUTH 1 TIME DAILY AS NEEDED. 30 Tab 5   • levothyroxine (SYNTHROID) 125 MCG Tab Take 1 Tab by mouth every morning before breakfast. 90 Tab 3   •  simvastatin (ZOCOR) 20 MG Tab Take 1 Tab by mouth every evening. 90 Tab 3   • alprazolam (XANAX) 0.5 MG Tab Take 1 Tab by mouth at bedtime as needed. 30 Tab 0   • Famotidine (PEPCID PO) Take  by mouth.     • cholecalciferol (VITAMIN D3) 5000 UNIT Cap Take 1 Cap by mouth every day. 90 Cap 3   • albuterol (VENTOLIN OR PROVENTIL) 108 (90 BASE) MCG/ACT Aero Soln inhalation aerosol Inhale 2 Puffs by mouth every 6 hours as needed for Shortness of Breath. 8.5 g 1   • Calcium Polycarbophil (FIBERCON PO) Take  by mouth 2 Times a Day. Takes two     • aspirin (ASA) 325 MG TABS Take 325 mg by mouth every 6 hours as needed.     • omeprazole (PRILOSEC) 20 MG CPDR Take 20 mg by mouth every day.     • celecoxib (CELEBREX) 100 MG Cap Take 1 Cap by mouth 2 times a day. 60 Cap 3     No current facility-administered medications for this visit.          Past Medical History:   Diagnosis Date   • Anxiety 2016   • Bronchitis    • Cholesterol blood decreased    • Cough 2014   • Diverticulitis    • Heart burn    • Hyperlipidemia    • Hypertension    • Hypothyroid    • Other specified disorder of intestines    • Viral upper respiratory tract infection 2017   • Vitamin D deficiency 2016       Social History     Tobacco Use   • Smoking status: Never Smoker   • Smokeless tobacco: Never Used   Substance Use Topics   • Alcohol use: No     Alcohol/week: 0.0 oz   • Drug use: No       Family Status   Relation Name Status   • Mo  Alive   • Fa     • OTHER  (Not Specified)     Family History   Problem Relation Age of Onset   • Stroke Mother    • Hypertension Mother    • Heart Disease Father 55   • Diabetes Other        ROS:    Respiratory: Negative for cough, sputum production, shortness of breath or wheezing.    Cardiovascular: Negative for chest pain, palpitations, orthopnea, dyspnea with exertion or edema.   Gastrointestinal: Negative for GI upset, nausea, vomiting, abdominal pain, constipation or diarrhea.  "  Genitourinary: Negative for dysuria, urgency, hesitancy or frequency.       Exam:    /82 (BP Location: Left arm, Patient Position: Sitting, BP Cuff Size: Large adult)   Pulse 100   Temp 36.9 °C (98.5 °F) (Temporal)   Ht 1.651 m (5' 5\")   Wt (!) 132.9 kg (293 lb)   SpO2 95%  Body mass index is 48.76 kg/m².  General:  Well nourished, well developed female in NAD.  Pulmonary: Clear to ausculation and percussion.  Normal effort. No rales, rhonchi, or wheezing.  Cardiovascular: Regular rate and rhythm without murmur.   Abdomen: Normal bowel sounds soft and nontender no palpable liver spleen bladder mass.  Extremities: No LE edema noted.  Neuro: Grossly nonfocal.  Psych: Alert and oriented to person, place, and time. Appropriate mood and conversation.          This dictation was created using voice recognition software. I have made reasonable attempts to correct errors, however, errors of grammar and content may exist.      LABS: Results reviewed and discussed with the patient, questions answered.      Assessment/Plan:    1. Morbid obesity with BMI of 45.0-49.9, adult (HCC)  Continued support diet exercise weight loss.    2. Essential hypertension  Blood pressure doing well continuing on medications discussed adding regular physical exercise.  - Comp Metabolic Panel; Future  - CBC WITH DIFFERENTIAL; Future    3. Hypothyroidism due to acquired atrophy of thyroid  Clinically euthyroid will be due for labs    4. Vitamin D deficiency  On supplementation will be due for labs at next draw    5. Fasting hyperglycemia  Substantially improved borderline nature discussed with the patient diet exercise weight loss  - HEMOGLOBIN A1C; Future    6. Pure hypercholesterolemia  Laboratory assessment continues on statin tolerating well  - Lipid Profile; Future    7. Anxiety  At baseline doing well with rare PRN Xanax    8. Class 3 severe obesity due to excess calories without serious comorbidity with body mass index (BMI) of " 50.0 to 59.9 in adult (HCC)  Discussed diet, exercise, weight loss, behavioral modification, portion size management.      9. Lower abdominal pain  Resolved appears to be associated with constipation.  Support       Patient was seen for 25 minutes face to face of which more than 50% of the time was spent in counseling and coordination of care regarding the above problems.

## 2020-07-06 NOTE — ASSESSMENT & PLAN NOTE
Patient reports she is doing well.  Utilizing PRN Xanax but does not require prescription.  States this is fairly rare.

## 2020-07-06 NOTE — ASSESSMENT & PLAN NOTE
Patient indicates her abdomen pain comes and goes she associates it with constipation improves with Metamucil fiber therapy.

## 2020-07-29 RX ORDER — IBUPROFEN 600 MG/1
TABLET ORAL
Qty: 90 TAB | Refills: 3 | Status: SHIPPED | OUTPATIENT
Start: 2020-07-29 | End: 2021-02-22

## 2020-07-29 RX ORDER — FUROSEMIDE 20 MG/1
TABLET ORAL
Qty: 90 TAB | Refills: 1 | Status: SHIPPED | OUTPATIENT
Start: 2020-07-29 | End: 2021-02-22

## 2020-07-29 NOTE — TELEPHONE ENCOUNTER
Last seen by PCP 7/6/2020.   Lab Results   Component Value Date/Time    SODIUM 139 06/26/2020 07:34 AM    POTASSIUM 4.0 06/26/2020 07:34 AM    CHLORIDE 101 06/26/2020 07:34 AM    CO2 23 06/26/2020 07:34 AM    GLUCOSE 94 06/26/2020 07:34 AM    BUN 21 06/26/2020 07:34 AM    CREATININE 0.80 06/26/2020 07:34 AM   Will send 6 month(s) to the pharmacy.

## 2020-08-24 RX ORDER — POTASSIUM CHLORIDE 20 MEQ/1
20 TABLET, EXTENDED RELEASE ORAL DAILY
Qty: 90 TAB | Refills: 1 | Status: SHIPPED | OUTPATIENT
Start: 2020-08-24 | End: 2021-02-16

## 2021-01-04 ENCOUNTER — HOSPITAL ENCOUNTER (OUTPATIENT)
Dept: LAB | Facility: MEDICAL CENTER | Age: 66
End: 2021-01-04
Attending: INTERNAL MEDICINE
Payer: MEDICARE

## 2021-01-04 DIAGNOSIS — E03.4 HYPOTHYROIDISM DUE TO ACQUIRED ATROPHY OF THYROID: ICD-10-CM

## 2021-01-04 DIAGNOSIS — R73.01 FASTING HYPERGLYCEMIA: ICD-10-CM

## 2021-01-04 DIAGNOSIS — I10 ESSENTIAL HYPERTENSION: ICD-10-CM

## 2021-01-04 DIAGNOSIS — E78.00 PURE HYPERCHOLESTEROLEMIA: ICD-10-CM

## 2021-01-04 LAB
ALBUMIN SERPL BCP-MCNC: 4.2 G/DL (ref 3.2–4.9)
ALBUMIN/GLOB SERPL: 1.3 G/DL
ALP SERPL-CCNC: 66 U/L (ref 30–99)
ALT SERPL-CCNC: 28 U/L (ref 2–50)
ANION GAP SERPL CALC-SCNC: 12 MMOL/L (ref 7–16)
AST SERPL-CCNC: 26 U/L (ref 12–45)
BASOPHILS # BLD AUTO: 0.9 % (ref 0–1.8)
BASOPHILS # BLD: 0.05 K/UL (ref 0–0.12)
BILIRUB SERPL-MCNC: 0.4 MG/DL (ref 0.1–1.5)
BUN SERPL-MCNC: 15 MG/DL (ref 8–22)
CALCIUM SERPL-MCNC: 9.5 MG/DL (ref 8.5–10.5)
CHLORIDE SERPL-SCNC: 103 MMOL/L (ref 96–112)
CHOLEST SERPL-MCNC: 157 MG/DL (ref 100–199)
CO2 SERPL-SCNC: 25 MMOL/L (ref 20–33)
CREAT SERPL-MCNC: 0.77 MG/DL (ref 0.5–1.4)
EOSINOPHIL # BLD AUTO: 0.33 K/UL (ref 0–0.51)
EOSINOPHIL NFR BLD: 5.6 % (ref 0–6.9)
ERYTHROCYTE [DISTWIDTH] IN BLOOD BY AUTOMATED COUNT: 40.8 FL (ref 35.9–50)
EST. AVERAGE GLUCOSE BLD GHB EST-MCNC: 126 MG/DL
FASTING STATUS PATIENT QL REPORTED: NORMAL
GLOBULIN SER CALC-MCNC: 3.3 G/DL (ref 1.9–3.5)
GLUCOSE SERPL-MCNC: 99 MG/DL (ref 65–99)
HBA1C MFR BLD: 6 % (ref 0–5.6)
HCT VFR BLD AUTO: 48.9 % (ref 37–47)
HDLC SERPL-MCNC: 39 MG/DL
HGB BLD-MCNC: 15.6 G/DL (ref 12–16)
IMM GRANULOCYTES # BLD AUTO: 0.01 K/UL (ref 0–0.11)
IMM GRANULOCYTES NFR BLD AUTO: 0.2 % (ref 0–0.9)
LDLC SERPL CALC-MCNC: 82 MG/DL
LYMPHOCYTES # BLD AUTO: 1.68 K/UL (ref 1–4.8)
LYMPHOCYTES NFR BLD: 28.7 % (ref 22–41)
MCH RBC QN AUTO: 28.9 PG (ref 27–33)
MCHC RBC AUTO-ENTMCNC: 31.9 G/DL (ref 33.6–35)
MCV RBC AUTO: 90.6 FL (ref 81.4–97.8)
MONOCYTES # BLD AUTO: 0.5 K/UL (ref 0–0.85)
MONOCYTES NFR BLD AUTO: 8.5 % (ref 0–13.4)
NEUTROPHILS # BLD AUTO: 3.28 K/UL (ref 2–7.15)
NEUTROPHILS NFR BLD: 56.1 % (ref 44–72)
NRBC # BLD AUTO: 0 K/UL
NRBC BLD-RTO: 0 /100 WBC
PLATELET # BLD AUTO: 257 K/UL (ref 164–446)
PMV BLD AUTO: 11 FL (ref 9–12.9)
POTASSIUM SERPL-SCNC: 3.9 MMOL/L (ref 3.6–5.5)
PROT SERPL-MCNC: 7.5 G/DL (ref 6–8.2)
RBC # BLD AUTO: 5.4 M/UL (ref 4.2–5.4)
SODIUM SERPL-SCNC: 140 MMOL/L (ref 135–145)
T4 FREE SERPL-MCNC: 1.33 NG/DL (ref 0.93–1.7)
TRIGL SERPL-MCNC: 182 MG/DL (ref 0–149)
TSH SERPL DL<=0.005 MIU/L-ACNC: 0.31 UIU/ML (ref 0.38–5.33)
WBC # BLD AUTO: 5.9 K/UL (ref 4.8–10.8)

## 2021-01-04 PROCEDURE — 84443 ASSAY THYROID STIM HORMONE: CPT

## 2021-01-04 PROCEDURE — 80061 LIPID PANEL: CPT

## 2021-01-04 PROCEDURE — 84439 ASSAY OF FREE THYROXINE: CPT

## 2021-01-04 PROCEDURE — 80053 COMPREHEN METABOLIC PANEL: CPT

## 2021-01-04 PROCEDURE — 85025 COMPLETE CBC W/AUTO DIFF WBC: CPT

## 2021-01-04 PROCEDURE — 36415 COLL VENOUS BLD VENIPUNCTURE: CPT

## 2021-01-04 PROCEDURE — 83036 HEMOGLOBIN GLYCOSYLATED A1C: CPT | Mod: GA

## 2021-01-26 ENCOUNTER — OFFICE VISIT (OUTPATIENT)
Dept: MEDICAL GROUP | Facility: PHYSICIAN GROUP | Age: 66
End: 2021-01-26
Payer: MEDICARE

## 2021-01-26 VITALS
BODY MASS INDEX: 43.99 KG/M2 | SYSTOLIC BLOOD PRESSURE: 144 MMHG | HEART RATE: 94 BPM | TEMPERATURE: 99.5 F | WEIGHT: 264 LBS | OXYGEN SATURATION: 97 % | DIASTOLIC BLOOD PRESSURE: 82 MMHG | HEIGHT: 65 IN | RESPIRATION RATE: 16 BRPM

## 2021-01-26 DIAGNOSIS — E66.01 CLASS 3 SEVERE OBESITY DUE TO EXCESS CALORIES WITHOUT SERIOUS COMORBIDITY WITH BODY MASS INDEX (BMI) OF 50.0 TO 59.9 IN ADULT (HCC): ICD-10-CM

## 2021-01-26 DIAGNOSIS — E66.01 MORBID OBESITY WITH BMI OF 45.0-49.9, ADULT (HCC): ICD-10-CM

## 2021-01-26 DIAGNOSIS — J40 BRONCHITIS: ICD-10-CM

## 2021-01-26 DIAGNOSIS — M54.50 CHRONIC BILATERAL LOW BACK PAIN WITHOUT SCIATICA: ICD-10-CM

## 2021-01-26 DIAGNOSIS — I10 ESSENTIAL HYPERTENSION: ICD-10-CM

## 2021-01-26 DIAGNOSIS — Z12.39 ENCOUNTER FOR SCREENING FOR MALIGNANT NEOPLASM OF BREAST, UNSPECIFIED SCREENING MODALITY: ICD-10-CM

## 2021-01-26 DIAGNOSIS — Z12.31 ENCOUNTER FOR SCREENING MAMMOGRAM FOR MALIGNANT NEOPLASM OF BREAST: ICD-10-CM

## 2021-01-26 DIAGNOSIS — R10.30 LOWER ABDOMINAL PAIN: ICD-10-CM

## 2021-01-26 DIAGNOSIS — E03.4 HYPOTHYROIDISM DUE TO ACQUIRED ATROPHY OF THYROID: ICD-10-CM

## 2021-01-26 DIAGNOSIS — F41.9 ANXIETY: ICD-10-CM

## 2021-01-26 DIAGNOSIS — E78.00 PURE HYPERCHOLESTEROLEMIA: ICD-10-CM

## 2021-01-26 DIAGNOSIS — G89.29 CHRONIC BILATERAL LOW BACK PAIN WITHOUT SCIATICA: ICD-10-CM

## 2021-01-26 DIAGNOSIS — R73.09 ELEVATED HEMOGLOBIN A1C: ICD-10-CM

## 2021-01-26 PROCEDURE — 99214 OFFICE O/P EST MOD 30 MIN: CPT | Performed by: INTERNAL MEDICINE

## 2021-01-26 RX ORDER — ALBUTEROL SULFATE 90 UG/1
2 AEROSOL, METERED RESPIRATORY (INHALATION) EVERY 6 HOURS PRN
Qty: 8.5 G | Refills: 1 | Status: SHIPPED | OUTPATIENT
Start: 2021-01-26 | End: 2022-05-23 | Stop reason: SDUPTHER

## 2021-01-26 RX ORDER — ALPRAZOLAM 0.5 MG/1
0.5 TABLET ORAL NIGHTLY PRN
Qty: 20 TAB | Refills: 0 | Status: SHIPPED | OUTPATIENT
Start: 2021-01-26 | End: 2021-02-15

## 2021-01-26 ASSESSMENT — FIBROSIS 4 INDEX: FIB4 SCORE: 1.24

## 2021-01-26 ASSESSMENT — PATIENT HEALTH QUESTIONNAIRE - PHQ9: CLINICAL INTERPRETATION OF PHQ2 SCORE: 0

## 2021-01-26 NOTE — PROGRESS NOTES
Chief Complaint   Patient presents with   • Lab Results   • Medication Management       HISTORY OF PRESENT ILLNESS: Patient is a 65 y.o. female established patient who presents today to discuss the medical issues below.    Hypothyroid  Patient continues on 125 mcg daily.      Hypertension  Not following at home.  Continues on irbesartan 300 mg , Lasix at 20 mg a day, KDUR 20 meq a day.     Morbid obesity with BMI of 45.0-49.9, adult (Formerly [facility])  30 # weight loss!!    Hyperlipidemia  Patient continue on simvastatin, great weight loss.     Obesity  Patient with new diet and is down 30-40#.  She is not exercising regullarly.      Chronic bilateral low back pain without sciatica  Patient reports low back aching is better with the weight loss.      Lower abdominal pain  No complaints.      Anxiety  Patient reports she has retired and is less anxious.  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  She is requesting some xanax for rare prn anxiety.        Patient Active Problem List    Diagnosis Date Noted   • Obesity 03/06/2013     Priority: Medium   • Chronic bilateral low back pain without sciatica 01/26/2021   • Lower abdominal pain 05/04/2020   • Morbid obesity with BMI of 45.0-49.9, adult (Formerly [facility]) 11/22/2019   • Viral upper respiratory tract infection 01/04/2017   • Anxiety 02/24/2016   • Vitamin D deficiency 02/23/2016   • Reactive airway disease without complication 11/12/2014   • Incisional hernia 03/31/2014   • Hypertension    • Hyperlipidemia    • Hypothyroid        Allergies:Biaxin [clarithromycin], Cipro xr, and Pain relief    Current Outpatient Medications   Medication Sig Dispense Refill   • albuterol 108 (90 Base) MCG/ACT Aero Soln inhalation aerosol Inhale 2 Puffs every 6 hours as needed for Shortness of Breath. 8.5 g 1   • ALPRAZolam (XANAX) 0.5 MG Tab Take 1 Tab by mouth at bedtime as needed for up to 20 days. 20 Tab 0   • levothyroxine (SYNTHROID) 125 MCG Tab Take 1 Tab by mouth every morning  before breakfast. 90 Tab 3   • simvastatin (ZOCOR) 20 MG Tab Take 1 Tab by mouth every evening. 90 Tab 3   • potassium chloride SA (KDUR) 20 MEQ Tab CR Take 1 Tab by mouth every day. 90 Tab 1   • ibuprofen (MOTRIN) 600 MG Tab TAKE 1 TABLET BY MOUTH EVERY 6 HOURS AS NEEDED 90 Tab 3   • furosemide (LASIX) 20 MG Tab TAKE 1 TABLET BY MOUTH EVERY DAY 90 Tab 1   • benzonatate (TESSALON) 100 MG Cap 1-2 caps PO TID prn cough 40 Cap 0   • irbesartan (AVAPRO) 300 MG Tab TAKE 1/2 TABLET (150MG) BY MOUTH EVERY DAY 45 Tab 3   • montelukast (SINGULAIR) 10 MG Tab TAKE 1 TAB BY MOUTH 1 TIME DAILY AS NEEDED. 30 Tab 5   • Famotidine (PEPCID PO) Take  by mouth.     • cholecalciferol (VITAMIN D3) 5000 UNIT Cap Take 1 Cap by mouth every day. 90 Cap 3   • Calcium Polycarbophil (FIBERCON PO) Take  by mouth 2 Times a Day. Takes two     • aspirin (ASA) 325 MG TABS Take 325 mg by mouth every 6 hours as needed.     • omeprazole (PRILOSEC) 20 MG CPDR Take 20 mg by mouth every day.       No current facility-administered medications for this visit.          Past Medical History:   Diagnosis Date   • Anxiety 2016   • Bronchitis    • Cholesterol blood decreased    • Cough 2014   • Diverticulitis    • Heart burn    • Hyperlipidemia    • Hypertension    • Hypothyroid    • Other specified disorder of intestines    • Viral upper respiratory tract infection 2017   • Vitamin D deficiency 2016       Social History     Tobacco Use   • Smoking status: Never Smoker   • Smokeless tobacco: Never Used   Substance Use Topics   • Alcohol use: No     Alcohol/week: 0.0 oz   • Drug use: No       Family Status   Relation Name Status   • Mo  Alive   • Fa     • OTHER  (Not Specified)     Family History   Problem Relation Age of Onset   • Stroke Mother    • Hypertension Mother    • Heart Disease Father 55   • Diabetes Other        ROS:    Respiratory: Negative for cough, sputum production, shortness of breath or wheezing.    Cardiovascular:  "Negative for chest pain, palpitations, orthopnea, dyspnea with exertion or edema.   Gastrointestinal: Negative for GI upset, nausea, vomiting, abdominal pain, constipation or diarrhea.   Genitourinary: Negative for dysuria, urgency, hesitancy or frequency.       Exam:    /82 (BP Location: Left arm, Patient Position: Sitting, BP Cuff Size: Large adult)   Pulse 94   Temp 37.5 °C (99.5 °F) (Temporal)   Resp 16   Ht 1.651 m (5' 5\")   Wt 119.7 kg (264 lb)   SpO2 97%  Body mass index is 43.93 kg/m².  General:  Well nourished, well developed female in NAD.  Spine: Minimal lower lumbar tenderness no point tenderness negative straight leg test.  Pulmonary: Clear to ausculation and percussion.  Normal effort. No rales, rhonchi, or wheezing.  Cardiovascular: Regular rate and rhythm without murmur.   Abdomen: Normal bowel sounds soft and nontender no palpable liver spleen bladder mass.  Extremities: No LE edema noted.  Neuro: Grossly nonfocal.  Psych: Alert and oriented to person, place, and time. Appropriate mood and conversation.    LABS: Results reviewed and discussed with the patient, questions answered.      This dictation was created using voice recognition software. I have made reasonable attempts to correct errors, however, errors of grammar and content may exist.          Assessment/Plan:    1. Hypothyroidism due to acquired atrophy of thyroid  Clinically euthyroid. Slightly over replaced regarding TSH but free T4 is normal. No change ongoing monitoring for now encourage patient  - TSH WITH REFLEX TO FT4; Future    2. Essential hypertension  Blood pressure borderline discussed implications of further weight loss potentially able to better control blood pressure no change for now  - Comp Metabolic Panel; Future  - CBC WITH DIFFERENTIAL; Future    3. Morbid obesity with BMI of 45.0-49.9, adult (HCC)  Substantial improvement encouraged Long discussion regarding diet exercise discussed    4. Pure " hypercholesterolemia  LDL cholesterol substantially improved with further elevation in HDL with current diet support given  - Lipid Profile; Future    5. Class 3 severe obesity due to excess calories without serious comorbidity with body mass index (BMI) of 50.0 to 59.9 in adult (HCC)  As above    6. Chronic bilateral low back pain without sciatica  Improving with weight loss    7. Lower abdominal pain  No complaints of abdomen pain today    8. Bronchitis  Requesting as needed albuterol MDI inhaler. Not currently utilizing except rarely refill written  - albuterol 108 (90 Base) MCG/ACT Aero Soln inhalation aerosol; Inhale 2 Puffs every 6 hours as needed for Shortness of Breath.  Dispense: 8.5 g; Refill: 1    9. Anxiety  Much improved with senior care. Discussed regular physical exercise. Xanax No. 20. No evidence of adverse reaction or abuse.  is reviewed and there are no current prescriptions  - ALPRAZolam (XANAX) 0.5 MG Tab; Take 1 Tab by mouth at bedtime as needed for up to 20 days.  Dispense: 20 Tab; Refill: 0    10. Encounter for screening for malignant neoplasm of breast, unspecified screening modality  Reviewed recommendations for mammogram implications. She is overdue by couple years  - MA-SCREENING MAMMO BILAT W/CAD; Future    11. Encounter for screening mammogram for malignant neoplasm of breast   As above  - MA-SCREENING MAMMO BILAT W/CAD; Future    12. Elevated hemoglobin A1c  A1c at 6.0. Implications diet exercise weight loss discussed.  - HEMOGLOBIN A1C; Future    Patient was seen for 25 minutes face to face of which more than 50% of the time was spent in counseling and coordination of care regarding the above problems.

## 2021-01-26 NOTE — ASSESSMENT & PLAN NOTE
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  She is requesting some xanax for rare prn anxiety.

## 2021-01-26 NOTE — ASSESSMENT & PLAN NOTE
Not following at home.  Continues on irbesartan 300 mg , Lasix at 20 mg a day, KDUR 20 meq a day.

## 2021-07-13 ENCOUNTER — HOSPITAL ENCOUNTER (OUTPATIENT)
Dept: LAB | Facility: MEDICAL CENTER | Age: 66
End: 2021-07-13
Attending: INTERNAL MEDICINE
Payer: MEDICARE

## 2021-07-13 DIAGNOSIS — E78.00 PURE HYPERCHOLESTEROLEMIA: ICD-10-CM

## 2021-07-13 DIAGNOSIS — I10 ESSENTIAL HYPERTENSION: ICD-10-CM

## 2021-07-13 DIAGNOSIS — E03.4 HYPOTHYROIDISM DUE TO ACQUIRED ATROPHY OF THYROID: ICD-10-CM

## 2021-07-13 DIAGNOSIS — R73.09 ELEVATED HEMOGLOBIN A1C: ICD-10-CM

## 2021-07-13 LAB
ALBUMIN SERPL BCP-MCNC: 4.6 G/DL (ref 3.2–4.9)
ALBUMIN/GLOB SERPL: 1.5 G/DL
ALP SERPL-CCNC: 63 U/L (ref 30–99)
ALT SERPL-CCNC: 24 U/L (ref 2–50)
ANION GAP SERPL CALC-SCNC: 12 MMOL/L (ref 7–16)
AST SERPL-CCNC: 22 U/L (ref 12–45)
BASOPHILS # BLD AUTO: 1.5 % (ref 0–1.8)
BASOPHILS # BLD: 0.09 K/UL (ref 0–0.12)
BILIRUB SERPL-MCNC: 0.6 MG/DL (ref 0.1–1.5)
BUN SERPL-MCNC: 19 MG/DL (ref 8–22)
CALCIUM SERPL-MCNC: 9.8 MG/DL (ref 8.5–10.5)
CHLORIDE SERPL-SCNC: 100 MMOL/L (ref 96–112)
CHOLEST SERPL-MCNC: 175 MG/DL (ref 100–199)
CO2 SERPL-SCNC: 27 MMOL/L (ref 20–33)
CREAT SERPL-MCNC: 0.79 MG/DL (ref 0.5–1.4)
EOSINOPHIL # BLD AUTO: 0.33 K/UL (ref 0–0.51)
EOSINOPHIL NFR BLD: 5.4 % (ref 0–6.9)
ERYTHROCYTE [DISTWIDTH] IN BLOOD BY AUTOMATED COUNT: 42.6 FL (ref 35.9–50)
EST. AVERAGE GLUCOSE BLD GHB EST-MCNC: 123 MG/DL
FASTING STATUS PATIENT QL REPORTED: NORMAL
GLOBULIN SER CALC-MCNC: 3.1 G/DL (ref 1.9–3.5)
GLUCOSE SERPL-MCNC: 105 MG/DL (ref 65–99)
HBA1C MFR BLD: 5.9 % (ref 4–5.6)
HCT VFR BLD AUTO: 51.3 % (ref 37–47)
HDLC SERPL-MCNC: 46 MG/DL
HGB BLD-MCNC: 16.4 G/DL (ref 12–16)
IMM GRANULOCYTES # BLD AUTO: 0.02 K/UL (ref 0–0.11)
IMM GRANULOCYTES NFR BLD AUTO: 0.3 % (ref 0–0.9)
LDLC SERPL CALC-MCNC: 101 MG/DL
LYMPHOCYTES # BLD AUTO: 1.82 K/UL (ref 1–4.8)
LYMPHOCYTES NFR BLD: 29.6 % (ref 22–41)
MCH RBC QN AUTO: 28.7 PG (ref 27–33)
MCHC RBC AUTO-ENTMCNC: 32 G/DL (ref 33.6–35)
MCV RBC AUTO: 89.7 FL (ref 81.4–97.8)
MONOCYTES # BLD AUTO: 0.57 K/UL (ref 0–0.85)
MONOCYTES NFR BLD AUTO: 9.3 % (ref 0–13.4)
NEUTROPHILS # BLD AUTO: 3.32 K/UL (ref 2–7.15)
NEUTROPHILS NFR BLD: 53.9 % (ref 44–72)
NRBC # BLD AUTO: 0 K/UL
NRBC BLD-RTO: 0 /100 WBC
PLATELET # BLD AUTO: 244 K/UL (ref 164–446)
PMV BLD AUTO: 10.7 FL (ref 9–12.9)
POTASSIUM SERPL-SCNC: 4.4 MMOL/L (ref 3.6–5.5)
PROT SERPL-MCNC: 7.7 G/DL (ref 6–8.2)
RBC # BLD AUTO: 5.72 M/UL (ref 4.2–5.4)
SODIUM SERPL-SCNC: 139 MMOL/L (ref 135–145)
TRIGL SERPL-MCNC: 138 MG/DL (ref 0–149)
TSH SERPL DL<=0.005 MIU/L-ACNC: 2.6 UIU/ML (ref 0.38–5.33)
WBC # BLD AUTO: 6.2 K/UL (ref 4.8–10.8)

## 2021-07-13 PROCEDURE — 36415 COLL VENOUS BLD VENIPUNCTURE: CPT | Mod: GA

## 2021-07-13 PROCEDURE — 84443 ASSAY THYROID STIM HORMONE: CPT

## 2021-07-13 PROCEDURE — 80053 COMPREHEN METABOLIC PANEL: CPT

## 2021-07-13 PROCEDURE — 85025 COMPLETE CBC W/AUTO DIFF WBC: CPT

## 2021-07-13 PROCEDURE — 83036 HEMOGLOBIN GLYCOSYLATED A1C: CPT | Mod: GA

## 2021-07-13 PROCEDURE — 80061 LIPID PANEL: CPT

## 2021-07-19 ENCOUNTER — OFFICE VISIT (OUTPATIENT)
Dept: MEDICAL GROUP | Facility: PHYSICIAN GROUP | Age: 66
End: 2021-07-19
Payer: MEDICARE

## 2021-07-19 VITALS
OXYGEN SATURATION: 97 % | BODY MASS INDEX: 42.08 KG/M2 | TEMPERATURE: 98.1 F | DIASTOLIC BLOOD PRESSURE: 68 MMHG | HEIGHT: 64 IN | RESPIRATION RATE: 12 BRPM | HEART RATE: 74 BPM | SYSTOLIC BLOOD PRESSURE: 118 MMHG | WEIGHT: 246.5 LBS

## 2021-07-19 DIAGNOSIS — E66.01 CLASS 3 SEVERE OBESITY DUE TO EXCESS CALORIES WITHOUT SERIOUS COMORBIDITY WITH BODY MASS INDEX (BMI) OF 50.0 TO 59.9 IN ADULT (HCC): ICD-10-CM

## 2021-07-19 DIAGNOSIS — E78.00 PURE HYPERCHOLESTEROLEMIA: ICD-10-CM

## 2021-07-19 DIAGNOSIS — D75.1 POLYCYTHEMIA: ICD-10-CM

## 2021-07-19 DIAGNOSIS — E03.4 HYPOTHYROIDISM DUE TO ACQUIRED ATROPHY OF THYROID: ICD-10-CM

## 2021-07-19 DIAGNOSIS — I10 ESSENTIAL HYPERTENSION: ICD-10-CM

## 2021-07-19 DIAGNOSIS — R73.01 FASTING HYPERGLYCEMIA: ICD-10-CM

## 2021-07-19 DIAGNOSIS — E74.39 GLUCOSE INTOLERANCE: ICD-10-CM

## 2021-07-19 DIAGNOSIS — R60.0 LOCALIZED EDEMA: ICD-10-CM

## 2021-07-19 PROCEDURE — 99214 OFFICE O/P EST MOD 30 MIN: CPT | Performed by: INTERNAL MEDICINE

## 2021-07-19 ASSESSMENT — FIBROSIS 4 INDEX: FIB4 SCORE: 1.21

## 2021-07-19 NOTE — PROGRESS NOTES
Chief Complaint   Patient presents with   • Lab Follow-up       HISTORY OF PRESENT ILLNESS: Patient is a 66 y.o. female established patient who presents today to discuss the medical issues below.    Obesity  20 # further weight loss.      Localized edema  Patient reports she has edema later in the day and wonders if she can another lasix.  She doesn't like the potassium but does take 1 a day.     Hypothyroid  Continues on levothyroxine at 125 mcg a day.  Had lab work done.    Hyperlipidemia  Patient continuing on the simvastatin 20 mg a day.  Labs done.  Weight declining.    Hypertension  Patient not following at home.  Continues on Avapro 300 mg a day.  Lasix at 20 mg a day potassium 20 mEq a day.  No chest pain palpitations.  Ankle swelling worse with more heat later in the day.  Patient admits to liking salt however she is working on a low-salt diet.      Patient Active Problem List    Diagnosis Date Noted   • Localized edema 07/19/2021   • Chronic bilateral low back pain without sciatica 01/26/2021   • Lower abdominal pain 05/04/2020   • Morbid obesity with BMI of 45.0-49.9, adult ([facility]) 11/22/2019   • Viral upper respiratory tract infection 01/04/2017   • Anxiety 02/24/2016   • Vitamin D deficiency 02/23/2016   • Reactive airway disease without complication 11/12/2014   • Incisional hernia 03/31/2014   • Obesity 03/06/2013   • Hypertension    • Hyperlipidemia    • Hypothyroid        Allergies:Biaxin [clarithromycin], Cipro xr, and Pain relief    Current Outpatient Medications   Medication Sig Dispense Refill   • furosemide (LASIX) 20 MG Tab TAKE 1 TABLET BY MOUTH EVERY DAY 90 tablet 1   • ibuprofen (MOTRIN) 600 MG Tab TAKE 1 TABLET BY MOUTH EVERY 6 HOURS AS NEEDED 90 tablet 3   • irbesartan (AVAPRO) 300 MG Tab TAKE 1/2 TABLET (150MG) BY MOUTH EVERY DAY 45 tablet 3   • potassium chloride SA (KDUR) 20 MEQ Tab CR TAKE 1 TABLET BY MOUTH EVERY DAY 90 tablet 3   • albuterol 108 (90 Base) MCG/ACT Aero Soln inhalation  aerosol Inhale 2 Puffs every 6 hours as needed for Shortness of Breath. 8.5 g 1   • levothyroxine (SYNTHROID) 125 MCG Tab Take 1 Tab by mouth every morning before breakfast. 90 Tab 3   • simvastatin (ZOCOR) 20 MG Tab Take 1 Tab by mouth every evening. 90 Tab 3   • benzonatate (TESSALON) 100 MG Cap 1-2 caps PO TID prn cough 40 Cap 0   • montelukast (SINGULAIR) 10 MG Tab TAKE 1 TAB BY MOUTH 1 TIME DAILY AS NEEDED. 30 Tab 5   • Famotidine (PEPCID PO) Take  by mouth.     • cholecalciferol (VITAMIN D3) 5000 UNIT Cap Take 1 Cap by mouth every day. 90 Cap 3   • Calcium Polycarbophil (FIBERCON PO) Take  by mouth 2 Times a Day. Takes two     • aspirin (ASA) 325 MG TABS Take 325 mg by mouth every 6 hours as needed.     • omeprazole (PRILOSEC) 20 MG CPDR Take 20 mg by mouth every day.       No current facility-administered medications for this visit.         Past Medical History:   Diagnosis Date   • Anxiety 2016   • Bronchitis    • Cholesterol blood decreased    • Cough 2014   • Diverticulitis    • Heart burn    • Hyperlipidemia    • Hypertension    • Hypothyroid    • Other specified disorder of intestines    • Viral upper respiratory tract infection 2017   • Vitamin D deficiency 2016       Social History     Tobacco Use   • Smoking status: Never Smoker   • Smokeless tobacco: Never Used   Vaping Use   • Vaping Use: Never used   Substance Use Topics   • Alcohol use: No     Alcohol/week: 0.0 oz   • Drug use: No       Family Status   Relation Name Status   • Mo  Alive   • Fa     • OTHER  (Not Specified)     Family History   Problem Relation Age of Onset   • Stroke Mother    • Hypertension Mother    • Heart Disease Father 55   • Diabetes Other        ROS:    Respiratory: Negative for cough, sputum production, shortness of breath or wheezing.    Cardiovascular: Negative for chest pain, palpitations, orthopnea, dyspnea with exertion or edema.   Gastrointestinal: Negative for GI upset, nausea, vomiting,  "abdominal pain, constipation or diarrhea.   Genitourinary: Negative for dysuria, urgency, hesitancy or frequency.       Exam:    /68 (BP Location: Left arm, Patient Position: Sitting, BP Cuff Size: Adult)   Pulse 74   Temp 36.7 °C (98.1 °F) (Temporal)   Resp 12   Ht 1.626 m (5' 4\")   Wt 112 kg (246 lb 8 oz)   SpO2 97%  Body mass index is 42.31 kg/m².  General:  Well nourished, well developed female in NAD.  Pulmonary: Clear to ausculation and percussion.  Normal effort. No rales, rhonchi, or wheezing.  Cardiovascular: Regular rate and rhythm without murmur.   Abdomen: Normal bowel sounds soft and nontender no palpable liver spleen bladder mass.  Extremities: No LE edema noted.  Neuro: Grossly nonfocal.  Psych: Alert and oriented to person, place, and time. Appropriate mood and conversation.    LABS: Results reviewed and discussed with the patient, questions answered.      This dictation was created using voice recognition software. I have made reasonable attempts to correct errors, however, errors of grammar and content may exist.          Assessment/Plan:    1. Class 3 severe obesity due to excess calories without serious comorbidity with body mass index (BMI) of 50.0 to 59.9 in adult (HCC)  Continues to improve.  She is down 60 pounds since the beginning of the year.  Discussed diet exercise weight loss, behavioral modification for maintenance and further improvement.    2. Localized edema  In the office no edema.  Sounds like some edema with the heat later in the day.  She is utilizing as needed spironolactone.  Discussed 3 times a week if more than that we should check labs.  Reviewed low-salt diet.    3. Essential hypertension  Stable on current meds monitor with weight  - Comp Metabolic Panel; Future    4. Pure hypercholesterolemia  Ongoing statin monitoring labs scheduled  - Lipid Profile; Future    5. Glucose intolerance  A1c at 5.8.  Encouraged patient weight is substantially improved.  On no " current medications.  - HEMOGLOBIN A1C; Future    6. Polycythemia  Slight pop in the H&H.  Differential could include sleep apnea.  This is mild and minimal and for the first time.  We will therefore go ahead and monitor for now with weight consideration for sleep studies and follow-up if persisting  - CBC WITH DIFFERENTIAL; Future    7. Fasting hyperglycemia  As above.  - HEMOGLOBIN A1C; Future    8. Hypothyroidism due to acquired atrophy of thyroid  Clinical and lab euthyroid.  No change    Patient was seen for 25 minutes face to face of which more than 50% of the time was spent in counseling and coordination of care regarding the above problems.

## 2021-07-19 NOTE — ASSESSMENT & PLAN NOTE
Patient not following at home.  Continues on Avapro 300 mg a day.  Lasix at 20 mg a day potassium 20 mEq a day.  No chest pain palpitations.  Ankle swelling worse with more heat later in the day.  Patient admits to liking salt however she is working on a low-salt diet.

## 2021-07-19 NOTE — ASSESSMENT & PLAN NOTE
Patient reports she has edema later in the day and wonders if she can another lasix.  She doesn't like the potassium but does take 1 a day.

## 2021-08-15 RX ORDER — IBUPROFEN 600 MG/1
TABLET ORAL
Qty: 90 TABLET | Refills: 3 | Status: SHIPPED | OUTPATIENT
Start: 2021-08-15 | End: 2022-01-17 | Stop reason: SDUPTHER

## 2021-09-07 RX ORDER — LEVOTHYROXINE SODIUM 0.12 MG/1
TABLET ORAL
Qty: 90 TABLET | Refills: 3 | Status: SHIPPED | OUTPATIENT
Start: 2021-09-07 | End: 2022-01-17 | Stop reason: SDUPTHER

## 2021-09-07 RX ORDER — SIMVASTATIN 20 MG
TABLET ORAL
Qty: 90 TABLET | Refills: 3 | Status: SHIPPED | OUTPATIENT
Start: 2021-09-07 | End: 2022-01-17 | Stop reason: SDUPTHER

## 2021-09-07 RX ORDER — FUROSEMIDE 20 MG/1
TABLET ORAL
Qty: 90 TABLET | Refills: 1 | Status: SHIPPED | OUTPATIENT
Start: 2021-09-07 | End: 2022-01-17 | Stop reason: SDUPTHER

## 2022-01-10 ENCOUNTER — HOSPITAL ENCOUNTER (OUTPATIENT)
Dept: LAB | Facility: MEDICAL CENTER | Age: 67
End: 2022-01-10
Attending: INTERNAL MEDICINE
Payer: MEDICARE

## 2022-01-10 DIAGNOSIS — E78.00 PURE HYPERCHOLESTEROLEMIA: ICD-10-CM

## 2022-01-10 DIAGNOSIS — R73.01 FASTING HYPERGLYCEMIA: ICD-10-CM

## 2022-01-10 DIAGNOSIS — E74.39 GLUCOSE INTOLERANCE: ICD-10-CM

## 2022-01-10 DIAGNOSIS — D75.1 POLYCYTHEMIA: ICD-10-CM

## 2022-01-10 DIAGNOSIS — I10 ESSENTIAL HYPERTENSION: ICD-10-CM

## 2022-01-10 LAB
ALBUMIN SERPL BCP-MCNC: 4.6 G/DL (ref 3.2–4.9)
ALBUMIN/GLOB SERPL: 1.7 G/DL
ALP SERPL-CCNC: 60 U/L (ref 30–99)
ALT SERPL-CCNC: 21 U/L (ref 2–50)
ANION GAP SERPL CALC-SCNC: 13 MMOL/L (ref 7–16)
AST SERPL-CCNC: 17 U/L (ref 12–45)
BASOPHILS # BLD AUTO: 1.6 % (ref 0–1.8)
BASOPHILS # BLD: 0.09 K/UL (ref 0–0.12)
BILIRUB SERPL-MCNC: 0.5 MG/DL (ref 0.1–1.5)
BUN SERPL-MCNC: 21 MG/DL (ref 8–22)
CALCIUM SERPL-MCNC: 9.6 MG/DL (ref 8.5–10.5)
CHLORIDE SERPL-SCNC: 100 MMOL/L (ref 96–112)
CHOLEST SERPL-MCNC: 180 MG/DL (ref 100–199)
CO2 SERPL-SCNC: 26 MMOL/L (ref 20–33)
CREAT SERPL-MCNC: 0.76 MG/DL (ref 0.5–1.4)
EOSINOPHIL # BLD AUTO: 0.29 K/UL (ref 0–0.51)
EOSINOPHIL NFR BLD: 5.2 % (ref 0–6.9)
ERYTHROCYTE [DISTWIDTH] IN BLOOD BY AUTOMATED COUNT: 39.9 FL (ref 35.9–50)
EST. AVERAGE GLUCOSE BLD GHB EST-MCNC: 123 MG/DL
FASTING STATUS PATIENT QL REPORTED: NORMAL
GLOBULIN SER CALC-MCNC: 2.7 G/DL (ref 1.9–3.5)
GLUCOSE SERPL-MCNC: 96 MG/DL (ref 65–99)
HBA1C MFR BLD: 5.9 % (ref 4–5.6)
HCT VFR BLD AUTO: 49.4 % (ref 37–47)
HDLC SERPL-MCNC: 49 MG/DL
HGB BLD-MCNC: 16.1 G/DL (ref 12–16)
IMM GRANULOCYTES # BLD AUTO: 0.01 K/UL (ref 0–0.11)
IMM GRANULOCYTES NFR BLD AUTO: 0.2 % (ref 0–0.9)
LDLC SERPL CALC-MCNC: 102 MG/DL
LYMPHOCYTES # BLD AUTO: 1.81 K/UL (ref 1–4.8)
LYMPHOCYTES NFR BLD: 32.3 % (ref 22–41)
MCH RBC QN AUTO: 28.4 PG (ref 27–33)
MCHC RBC AUTO-ENTMCNC: 32.6 G/DL (ref 33.6–35)
MCV RBC AUTO: 87.3 FL (ref 81.4–97.8)
MONOCYTES # BLD AUTO: 0.57 K/UL (ref 0–0.85)
MONOCYTES NFR BLD AUTO: 10.2 % (ref 0–13.4)
NEUTROPHILS # BLD AUTO: 2.83 K/UL (ref 2–7.15)
NEUTROPHILS NFR BLD: 50.5 % (ref 44–72)
NRBC # BLD AUTO: 0 K/UL
NRBC BLD-RTO: 0 /100 WBC
PLATELET # BLD AUTO: 265 K/UL (ref 164–446)
PMV BLD AUTO: 10.3 FL (ref 9–12.9)
POTASSIUM SERPL-SCNC: 3.9 MMOL/L (ref 3.6–5.5)
PROT SERPL-MCNC: 7.3 G/DL (ref 6–8.2)
RBC # BLD AUTO: 5.66 M/UL (ref 4.2–5.4)
SODIUM SERPL-SCNC: 139 MMOL/L (ref 135–145)
TRIGL SERPL-MCNC: 147 MG/DL (ref 0–149)
WBC # BLD AUTO: 5.6 K/UL (ref 4.8–10.8)

## 2022-01-10 PROCEDURE — 83036 HEMOGLOBIN GLYCOSYLATED A1C: CPT | Mod: GA

## 2022-01-10 PROCEDURE — 80061 LIPID PANEL: CPT

## 2022-01-10 PROCEDURE — 85025 COMPLETE CBC W/AUTO DIFF WBC: CPT

## 2022-01-10 PROCEDURE — 36415 COLL VENOUS BLD VENIPUNCTURE: CPT

## 2022-01-10 PROCEDURE — 80053 COMPREHEN METABOLIC PANEL: CPT

## 2022-01-17 ENCOUNTER — OFFICE VISIT (OUTPATIENT)
Dept: MEDICAL GROUP | Facility: PHYSICIAN GROUP | Age: 67
End: 2022-01-17
Payer: MEDICARE

## 2022-01-17 VITALS
HEART RATE: 83 BPM | DIASTOLIC BLOOD PRESSURE: 70 MMHG | OXYGEN SATURATION: 94 % | WEIGHT: 253 LBS | HEIGHT: 65 IN | BODY MASS INDEX: 42.15 KG/M2 | SYSTOLIC BLOOD PRESSURE: 110 MMHG | RESPIRATION RATE: 16 BRPM | TEMPERATURE: 100 F

## 2022-01-17 DIAGNOSIS — E55.9 VITAMIN D DEFICIENCY: ICD-10-CM

## 2022-01-17 DIAGNOSIS — I10 PRIMARY HYPERTENSION: ICD-10-CM

## 2022-01-17 DIAGNOSIS — D75.1 POLYCYTHEMIA: ICD-10-CM

## 2022-01-17 DIAGNOSIS — E03.4 HYPOTHYROIDISM DUE TO ACQUIRED ATROPHY OF THYROID: ICD-10-CM

## 2022-01-17 DIAGNOSIS — G47.00 INSOMNIA, UNSPECIFIED TYPE: ICD-10-CM

## 2022-01-17 DIAGNOSIS — E78.00 PURE HYPERCHOLESTEROLEMIA: ICD-10-CM

## 2022-01-17 DIAGNOSIS — E66.01 MORBID OBESITY WITH BMI OF 45.0-49.9, ADULT (HCC): ICD-10-CM

## 2022-01-17 PROCEDURE — 99214 OFFICE O/P EST MOD 30 MIN: CPT | Performed by: INTERNAL MEDICINE

## 2022-01-17 RX ORDER — ALPRAZOLAM 0.5 MG/1
0.5 TABLET ORAL NIGHTLY PRN
COMMUNITY
End: 2022-01-17 | Stop reason: SDUPTHER

## 2022-01-17 RX ORDER — POTASSIUM CHLORIDE 20 MEQ/1
20 TABLET, EXTENDED RELEASE ORAL
Qty: 90 TABLET | Refills: 3 | Status: SHIPPED | OUTPATIENT
Start: 2022-01-17

## 2022-01-17 RX ORDER — IBUPROFEN 600 MG/1
600 TABLET ORAL EVERY 6 HOURS PRN
Qty: 90 TABLET | Refills: 3 | Status: SHIPPED | OUTPATIENT
Start: 2022-01-17

## 2022-01-17 RX ORDER — SIMVASTATIN 20 MG
20 TABLET ORAL EVERY EVENING
Qty: 90 TABLET | Refills: 3 | Status: SHIPPED | OUTPATIENT
Start: 2022-01-17

## 2022-01-17 RX ORDER — FUROSEMIDE 20 MG/1
20 TABLET ORAL
Qty: 90 TABLET | Refills: 3 | Status: SHIPPED | OUTPATIENT
Start: 2022-01-17

## 2022-01-17 RX ORDER — IRBESARTAN 300 MG/1
TABLET ORAL
Qty: 45 TABLET | Refills: 3 | Status: SHIPPED | OUTPATIENT
Start: 2022-01-17

## 2022-01-17 RX ORDER — ALPRAZOLAM 0.5 MG/1
0.5 TABLET ORAL NIGHTLY PRN
Qty: 30 TABLET | Refills: 0 | Status: SHIPPED | OUTPATIENT
Start: 2022-01-17 | End: 2022-02-16

## 2022-01-17 RX ORDER — LEVOTHYROXINE SODIUM 0.12 MG/1
125 TABLET ORAL
Qty: 90 TABLET | Refills: 3 | Status: SHIPPED | OUTPATIENT
Start: 2022-01-17

## 2022-01-17 RX ORDER — FAMOTIDINE 20 MG/1
20 TABLET, FILM COATED ORAL
Qty: 90 TABLET | Refills: 3 | Status: SHIPPED | OUTPATIENT
Start: 2022-01-17

## 2022-01-17 ASSESSMENT — PATIENT HEALTH QUESTIONNAIRE - PHQ9: CLINICAL INTERPRETATION OF PHQ2 SCORE: 0

## 2022-01-17 ASSESSMENT — FIBROSIS 4 INDEX: FIB4 SCORE: 0.92

## 2022-01-17 NOTE — PROGRESS NOTES
Chief Complaint   Patient presents with   • Follow-Up     labs       HISTORY OF PRESENT ILLNESS: Patient is a 66 y.o. female established patient who presents today to discuss the medical issues below.    No problem-specific Assessment & Plan notes found for this encounter.      Patient Active Problem List    Diagnosis Date Noted   • Localized edema 07/19/2021   • Chronic bilateral low back pain without sciatica 01/26/2021   • Lower abdominal pain 05/04/2020   • Morbid obesity with BMI of 45.0-49.9, adult (HCC) 11/22/2019   • Viral upper respiratory tract infection 01/04/2017   • Anxiety 02/24/2016   • Vitamin D deficiency 02/23/2016   • Reactive airway disease without complication 11/12/2014   • Incisional hernia 03/31/2014   • Obesity 03/06/2013   • Hypertension    • Hyperlipidemia    • Hypothyroid        Allergies:Biaxin [clarithromycin], Cipro xr, and Pain relief    Current Outpatient Medications   Medication Sig Dispense Refill   • cholecalciferol (VITAMIN D3) 5000 UNIT Cap Take 1 Capsule by mouth every day. 90 Capsule 3   • famotidine (PEPCID) 20 MG Tab Take 1 Tablet by mouth every evening as needed. 90 Tablet 3   • furosemide (LASIX) 20 MG Tab Take 1 Tablet by mouth every day. 90 Tablet 3   • irbesartan (AVAPRO) 300 MG Tab TAKE 1/2 TABLET (150MG) BY MOUTH EVERY DAY 45 Tablet 3   • levothyroxine (SYNTHROID) 125 MCG Tab Take 1 Tablet by mouth every morning before breakfast. 90 Tablet 3   • simvastatin (ZOCOR) 20 MG Tab Take 1 Tablet by mouth every evening. 90 Tablet 3   • potassium chloride SA (KDUR) 20 MEQ Tab CR Take 1 Tablet by mouth every day. 90 Tablet 3   • ibuprofen (MOTRIN) 600 MG Tab Take 1 Tablet by mouth every 6 hours as needed. 90 Tablet 3   • ALPRAZolam (XANAX) 0.5 MG Tab Take 1 Tablet by mouth at bedtime as needed for Sleep for up to 30 days. 30 Tablet 0   • albuterol 108 (90 Base) MCG/ACT Aero Soln inhalation aerosol Inhale 2 Puffs every 6 hours as needed for Shortness of Breath. 8.5 g 1   •  "aspirin (ASA) 325 MG TABS Take 325 mg by mouth every 6 hours as needed.       No current facility-administered medications for this visit.         Past Medical History:   Diagnosis Date   • Anxiety 2016   • Bronchitis    • Cholesterol blood decreased    • Cough 2014   • Diverticulitis    • Heart burn    • Hyperlipidemia    • Hypertension    • Hypothyroid    • Other specified disorder of intestines    • Viral upper respiratory tract infection 2017   • Vitamin D deficiency 2016       Social History     Tobacco Use   • Smoking status: Never Smoker   • Smokeless tobacco: Never Used   Vaping Use   • Vaping Use: Never used   Substance Use Topics   • Alcohol use: No     Alcohol/week: 0.0 oz   • Drug use: No       Family Status   Relation Name Status   • Mo  Alive   • Fa     • OTHER  (Not Specified)     Family History   Problem Relation Age of Onset   • Stroke Mother    • Hypertension Mother    • Heart Disease Father 55   • Diabetes Other        ROS:    Respiratory: Negative for cough, sputum production, shortness of breath or wheezing.    Cardiovascular: Negative for chest pain, palpitations, orthopnea, dyspnea with exertion or edema.   Gastrointestinal: Negative for GI upset, nausea, vomiting, abdominal pain, constipation or diarrhea.   Genitourinary: Negative for dysuria, urgency, hesitancy or frequency.       Exam:    /70   Pulse 83   Temp 37.8 °C (100 °F) (Temporal)   Resp 16   Ht 1.651 m (5' 5\")   Wt 115 kg (253 lb)   SpO2 94%  Body mass index is 42.1 kg/m².  General:  Well nourished, well developed female in NAD.  Pulmonary: Clear to ausculation and percussion.  Normal effort. No rales, rhonchi, or wheezing.  Cardiovascular: Regular rate and rhythm without murmur.   Abdomen: Normal bowel sounds soft and nontender no palpable liver spleen bladder mass.  Extremities: No LE edema noted.  Neuro: Grossly nonfocal.  Psych: Alert and oriented to person, place, and time. Appropriate " mood and conversation.    LABS: Results reviewed and discussed with the patient, questions answered.    This dictation was created using voice recognition software. I have made reasonable attempts to correct errors, however, errors of grammar and content may exist.          Assessment/Plan:    1. Primary hypertension  continues to work on diet.  Patient continues on Avapro 150 mg a day and diuretic with Lasix and potassium.  Refills written.  - Comp Metabolic Panel; Future  - CBC WITH DIFFERENTIAL; Future    2. Pure hypercholesterolemia  Continues on simvastatin implications of diet discussed ongoing lab monitoring  - Lipid Profile; Future    3. Morbid obesity with BMI of 45.0-49.9, adult ([facility])  Discussed diet, exercise, weight loss, behavioral modification, portion size management.  Behavior modification with minimal insight discussed with patient.      4. Hypothyroidism due to acquired atrophy of thyroid  Clinical and lab euthyroid continuing supplementation refills written    5. Vitamin D deficiency  On supplementation recommend Levophed assessment at follow-up  - cholecalciferol (VITAMIN D3) 5000 UNIT Cap; Take 1 Capsule by mouth every day.  Dispense: 90 Capsule; Refill: 3    6. Polycythemia  Persistence of polycythemia.  On discussion today patient with minimal insight into the implications.  Referral to sleep studies for potential sleep apnea is placed discussed at length, including implications of weight.  - Referral to Pulmonary and Sleep Medicine    7. Insomnia, unspecified type  Patient continues to rely on Xanax periodically.  She received number 30 tablets in the last year.  No evidence of adverse reaction or abuse.   is reviewed.  - ALPRAZolam (XANAX) 0.5 MG Tab; Take 1 Tablet by mouth at bedtime as needed for Sleep for up to 30 days.  Dispense: 30 Tablet; Refill: 0    Patient was seen for 25 minutes face to face of which more than 50% of the time was spent in counseling and coordination of care  regarding the above problems.

## 2022-05-23 ENCOUNTER — HOSPITAL ENCOUNTER (OUTPATIENT)
Facility: MEDICAL CENTER | Age: 67
End: 2022-05-23
Attending: NURSE PRACTITIONER
Payer: MEDICARE

## 2022-05-23 ENCOUNTER — OFFICE VISIT (OUTPATIENT)
Dept: URGENT CARE | Facility: PHYSICIAN GROUP | Age: 67
End: 2022-05-23
Payer: MEDICARE

## 2022-05-23 VITALS
DIASTOLIC BLOOD PRESSURE: 76 MMHG | BODY MASS INDEX: 44.65 KG/M2 | HEIGHT: 65 IN | OXYGEN SATURATION: 97 % | RESPIRATION RATE: 16 BRPM | TEMPERATURE: 98.8 F | SYSTOLIC BLOOD PRESSURE: 110 MMHG | WEIGHT: 268 LBS | HEART RATE: 91 BPM

## 2022-05-23 DIAGNOSIS — R31.29 HEMATURIA, MICROSCOPIC: ICD-10-CM

## 2022-05-23 DIAGNOSIS — M53.3 PAIN OF LEFT SACROILIAC JOINT: ICD-10-CM

## 2022-05-23 DIAGNOSIS — M54.50 LOW BACK PAIN, UNSPECIFIED BACK PAIN LATERALITY, UNSPECIFIED CHRONICITY, UNSPECIFIED WHETHER SCIATICA PRESENT: ICD-10-CM

## 2022-05-23 DIAGNOSIS — J45.20 MILD INTERMITTENT REACTIVE AIRWAY DISEASE WITHOUT COMPLICATION: ICD-10-CM

## 2022-05-23 LAB
APPEARANCE UR: CLEAR
BILIRUB UR STRIP-MCNC: NEGATIVE MG/DL
COLOR UR AUTO: YELLOW
GLUCOSE UR STRIP.AUTO-MCNC: NEGATIVE MG/DL
KETONES UR STRIP.AUTO-MCNC: NEGATIVE MG/DL
LEUKOCYTE ESTERASE UR QL STRIP.AUTO: NEGATIVE
NITRITE UR QL STRIP.AUTO: NEGATIVE
PH UR STRIP.AUTO: 5.5 [PH] (ref 5–8)
PROT UR QL STRIP: NEGATIVE MG/DL
RBC UR QL AUTO: ABNORMAL
SP GR UR STRIP.AUTO: 1.02
UROBILINOGEN UR STRIP-MCNC: 0.2 MG/DL

## 2022-05-23 PROCEDURE — 87086 URINE CULTURE/COLONY COUNT: CPT

## 2022-05-23 PROCEDURE — 81002 URINALYSIS NONAUTO W/O SCOPE: CPT | Performed by: NURSE PRACTITIONER

## 2022-05-23 PROCEDURE — 99214 OFFICE O/P EST MOD 30 MIN: CPT | Performed by: NURSE PRACTITIONER

## 2022-05-23 RX ORDER — ALBUTEROL SULFATE 90 UG/1
2 AEROSOL, METERED RESPIRATORY (INHALATION) EVERY 6 HOURS PRN
Qty: 8.5 G | Refills: 1 | Status: SHIPPED | OUTPATIENT
Start: 2022-05-23

## 2022-05-23 ASSESSMENT — ENCOUNTER SYMPTOMS
BACK PAIN: 1
FEVER: 0
FLANK PAIN: 0
CHILLS: 0
FALLS: 0

## 2022-05-23 ASSESSMENT — FIBROSIS 4 INDEX: FIB4 SCORE: 0.92

## 2022-05-23 NOTE — PROGRESS NOTES
"Subjective     Elizabeth Mendoza is a 66 y.o. female who presents with Low Back Pain (X 2 weeks ago then went away an came back last weekend)             comes in today with a 2 week history of left sided low back and SI region pain.  She reports symptoms waxed and waned but now feel persistent.  Pain is worse when rising from recliner to standing position.  No radicular pain or saddle anesthesia.  She is taking ibuprofen 600 mg BID with minimal relief.  No leg weakness.  She denies any known trauma or strain.  She denies any fever, chills, abdominal pain, diarrhea, constipation, nausea, or dysuria. She had some trace red blood noted on toilet paper after urinating for 2-3 days last week and reported strong smelling urine.  She has had hematuria historically with UTI.  No history of pyelonephritis or kidney stones.  No known chronic spinal disease.  BMI 44.6.  She has a second concern today: requests albuterol.  She uses this PRN, with good relief of chest tightness.  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  She is scheduled to establish with a new PCP next month.         Review of Systems   Constitutional: Negative for chills, fever and malaise/fatigue.   Cardiovascular: Negative for chest pain.   Genitourinary: Negative for dysuria, flank pain, frequency and urgency.   Musculoskeletal: Positive for back pain. Negative for falls.     Medications, Allergies, and current problem list reviewed today in Epic         Objective     Blood Pressure 110/76   Pulse 91   Temperature 37.1 °C (98.8 °F) (Temporal)   Respiration 16   Height 1.651 m (5' 5\")   Weight 122 kg (268 lb)   Oxygen Saturation 97%   Body Mass Index 44.60 kg/m²      Physical Exam  Vitals reviewed.   Constitutional:       General: She is not in acute distress.     Appearance: Normal appearance. She is well-developed. She is obese. She is not " ill-appearing, toxic-appearing or diaphoretic.   Cardiovascular:      Rate and Rhythm: Normal rate and regular rhythm.      Heart sounds: Normal heart sounds. No murmur heard.    No friction rub. No gallop.   Pulmonary:      Effort: Pulmonary effort is normal. No respiratory distress.      Breath sounds: Normal breath sounds. No stridor. No wheezing, rhonchi or rales.   Chest:      Chest wall: No tenderness.   Abdominal:      General: Bowel sounds are normal. There is no abdominal bruit.      Palpations: Abdomen is soft. Abdomen is not rigid. There is no shifting dullness, fluid wave or pulsatile mass.      Tenderness: There is no abdominal tenderness. There is no right CVA tenderness or left CVA tenderness. Negative signs include Gilbert's sign and McBurney's sign.   Musculoskeletal:      Comments: Back is warm, dry, and intact with no bruising, swelling, erythema, rash or lesion.  No midline spinal TTP or overt deformity.  No CVA tenderness to percussion.  No palpable muscle spasm.  Generalized left sided lumbosacral pain with left SI region TTP.  Spinal ROM grossly intact.  Wincing noted when rising from seated to standing position.  Leg strength grossly intact.  No gait abnormalities.     Skin:     General: Skin is warm and dry.      Coloration: Skin is not jaundiced or pale.   Neurological:      Mental Status: She is alert and oriented to person, place, and time.   Psychiatric:         Mood and Affect: Mood normal.             Contains abnormal data POCT Urinalysis  Order: [id number]   Status: Final result     Visible to patient: Ely (scheduled for 5/24/2022  8:12 AM)     Next appt: None     Dx: Low back pain, unspecified back pain ...     0 Result Notes    Component Ref Range & Units 10:11 AM 2 yr ago 9 yr ago   POC Color Negative yellow  Red  brown    POC Appearance Negative clear  Cloudy  cloudy CM    POC Leukocyte Esterase Negative negative  Small  large    POC Nitrites Negative negative  Positive  pos.    POC  Urobiligen Negative (0.2) mg/dL 0.2  1  neg.    POC Protein Negative mg/dL negative  100  30    POC Urine PH 5.0 - 8.0 5.5  7  6.0    POC Blood Negative trace  Large  large    POC Specific Gravity <1.005 - >1.030 1.020  1.020  1.030    POC Ketones Negative mg/dL negative  Neg  neg.    POC Bilirubin Negative mg/dL negative  Small  neg.    POC Glucose Negative mg/dL negative  Neg  neg.    Resulting Agency  [facility]                Specimen Collected: 05/23/22 10:11 AM Last Resulted: 05/23/22 10:12 AM                           Assessment & Plan         1. Low back pain, unspecified back pain laterality, unspecified chronicity, unspecified whether sciatica present  POCT Urinalysis    URINE CULTURE(NEW)   2. Pain of left sacroiliac joint     3. Hematuria, microscopic  URINE CULTURE(NEW)   4. Mild intermittent reactive airway disease without complication       Discussed exam findings with .  tiology unclear. Differential reviewed.    Will obtain Urine culture to rule out UTI.  Will treat with antibiotics if indicated on culture.  Suspect musculoskeletal etiology.  No imaging indicated today due to lack of trauma or fall.   mu declined referral to physical therapy.  She would like to pursue imaging if symptoms persist without improvement.  Trial OTC tylenol and ibuprofen prn pain.    Avoid prolonged sitting.  Daily physical activity encouraged.    Maintain adequate hydration.  Follow up with PCP in 1 month as scheduled or RTC sooner if symptoms persist without improvement.  ED precautions reviewed.  Albuterol as prescribed prn chest tightness.  She verbalized understanding of and agreed with plan of care.

## 2022-05-25 LAB
BACTERIA UR CULT: NORMAL
SIGNIFICANT IND 70042: NORMAL
SITE SITE: NORMAL
SOURCE SOURCE: NORMAL

## 2022-06-01 ENCOUNTER — OFFICE VISIT (OUTPATIENT)
Dept: URGENT CARE | Facility: PHYSICIAN GROUP | Age: 67
End: 2022-06-01
Payer: MEDICARE

## 2022-06-01 ENCOUNTER — HOSPITAL ENCOUNTER (OUTPATIENT)
Facility: MEDICAL CENTER | Age: 67
End: 2022-06-01
Attending: PHYSICIAN ASSISTANT
Payer: MEDICARE

## 2022-06-01 VITALS
HEIGHT: 65 IN | WEIGHT: 264 LBS | SYSTOLIC BLOOD PRESSURE: 118 MMHG | OXYGEN SATURATION: 97 % | HEART RATE: 84 BPM | DIASTOLIC BLOOD PRESSURE: 78 MMHG | BODY MASS INDEX: 43.99 KG/M2 | TEMPERATURE: 98.3 F | RESPIRATION RATE: 16 BRPM

## 2022-06-01 DIAGNOSIS — N30.10 INTERSTITIAL CYSTITIS: ICD-10-CM

## 2022-06-01 DIAGNOSIS — S39.012A STRAIN OF MUSCLE, FASCIA AND TENDON OF LOWER BACK, INITIAL ENCOUNTER: ICD-10-CM

## 2022-06-01 LAB
APPEARANCE UR: CLEAR
BILIRUB UR STRIP-MCNC: NEGATIVE MG/DL
COLOR UR AUTO: YELLOW
GLUCOSE UR STRIP.AUTO-MCNC: NEGATIVE MG/DL
KETONES UR STRIP.AUTO-MCNC: NEGATIVE MG/DL
LEUKOCYTE ESTERASE UR QL STRIP.AUTO: NEGATIVE
NITRITE UR QL STRIP.AUTO: NEGATIVE
PH UR STRIP.AUTO: 6 [PH] (ref 5–8)
PROT UR QL STRIP: 30 MG/DL
RBC UR QL AUTO: NEGATIVE
SP GR UR STRIP.AUTO: 1.03
UROBILINOGEN UR STRIP-MCNC: 0.2 MG/DL

## 2022-06-01 PROCEDURE — 81002 URINALYSIS NONAUTO W/O SCOPE: CPT | Performed by: PHYSICIAN ASSISTANT

## 2022-06-01 PROCEDURE — 99214 OFFICE O/P EST MOD 30 MIN: CPT | Performed by: PHYSICIAN ASSISTANT

## 2022-06-01 PROCEDURE — 87086 URINE CULTURE/COLONY COUNT: CPT

## 2022-06-01 ASSESSMENT — FIBROSIS 4 INDEX: FIB4 SCORE: 0.92

## 2022-06-01 NOTE — PROGRESS NOTES
This note below was created by Brandy Lee, PA Student. I have personally performed a history, physical examination, and discussion of treatment plan with the patient. I have reviewed the documentation which accurately reflects work and decisions made by me.     Daryl Crane PA-C

## 2022-06-01 NOTE — NON-PROVIDER
Subjective:   Elizabeth Mendoza is a 66 y.o. female who presents for Painful Urination (Was seen last week for a uti but not treated. Has been taking Azo an that helped the pain.) and Low Back Pain (Was seen last week an the pain is still there)      HPI:  Patient is a 67 yo female with complaints of left lower back pain and symptoms of a UTI. She was seen in the clinic 7 days ago for the same concerns and her UA and urine culture showed no signs of a UTI. She came back today because she started having urinary frequency, burning, and incomplete voiding 3 days ago and her left lower back pain has not subsided. She reports that her  noticed a lump or knot in her lower middle to left back before her last appointment which was tender to palpation and the pain radiated to her left flank area. Now the lump is gone and the pain is only in her left low back area. The pain is alleviated by rest and exacerbated by standing up and left trunk rotation. No obvious injury or trauma. She denies fever, chills, SOB, chest pain, nausea, vomiting, constipation, abdominal tenderness, and vaginal discharge, numbness, tingling, weakness, radiation pain, loss of bowel or bladder control.     ROS    Medications:    • albuterol Aers  • aspirin EC Tbec  • cholecalciferol Caps  • famotidine Tabs  • furosemide Tabs  • ibuprofen Tabs  • irbesartan Tabs  • levothyroxine Tabs  • potassium chloride SA Tbcr  • simvastatin Tabs    Allergies: Biaxin [clarithromycin], Cipro xr, and Pain relief    Problem List: Elizabeth Mendoza does not have any pertinent problems on file.    Surgical History:  Past Surgical History:   Procedure Laterality Date   • VENTRAL HERNIA REPAIR  3/31/2014    Performed by John H Ganser, M.D. at [facility]   • LUMPECTOMY  11/09    benignleft breast lump removed   • COLON RESECTION  2006   • APPENDECTOMY  2006   • GERRY BY LAPAROSCOPY  1996   • CYST EXCISION  1990    b/l breast   • TUBAL LIGATION  1987     "right oophorectomy   • TONSILLECTOMY  1960   • COLONOSCOPY  2006/2014    x 2       Past Social Hx: Elizabeth Mendoza  reports that she has never smoked. She has never used smokeless tobacco. She reports that she does not drink alcohol and does not use drugs.     Past Family Hx:  Elizabeth Mendoza family history includes Diabetes in an other family member; Heart Disease (age of onset: 55) in her father; Hypertension in her mother; Stroke in her mother.     Problem list, medications, and allergies reviewed by myself today in Epic.     Objective:     /78   Pulse 84   Temp 36.8 °C (98.3 °F) (Temporal)   Resp 16   Ht 1.651 m (5' 5\")   Wt 120 kg (264 lb)   SpO2 97%   BMI 43.93 kg/m²     Physical Exam  Vitals reviewed.   Constitutional:       Appearance: Normal appearance.   Eyes:      Pupils: Pupils are equal, round, and reactive to light.   Cardiovascular:      Rate and Rhythm: Normal rate and regular rhythm.      Heart sounds: Normal heart sounds.   Pulmonary:      Breath sounds: Normal breath sounds. No wheezing, rhonchi or rales.   Abdominal:      Tenderness: There is no abdominal tenderness. There is no right CVA tenderness or left CVA tenderness.   Musculoskeletal:      Cervical back: Neck supple.      Thoracic back: Decreased range of motion (left trunk rotation).      Comments: Back: Full range of flexion, extension, rotation, lateralization. reproduction of tenderness to left rotation.   Mild tenderness to palpation to left lumbar back.   Negative midline tenderness, bony tenderness, crepitus, deformities, or step-offs.           Skin:     Findings: No erythema or rash.   Neurological:      General: No focal deficit present.      Mental Status: She is alert and oriented to person, place, and time.      Motor: Motor function is intact.      Gait: Gait is intact.      Comments: Strength 5/5 bilateral lower extremities.    Psychiatric:         Mood and Affect: Mood normal.         Behavior: Behavior " normal.       Assessment/Plan:     Diagnosis and associated orders:     1. Strain of muscle, fascia and tendon of lower back, initial encounter     2. Interstitial cystitis  POCT Urinalysis    URINE CULTURE(NEW)      Comments/MDM:     • Patient's UA showed no signs of blood or infection. Low suspesion for kidney stone. I do not suspect pyelonephritis at this time. Urine culture pending. She most likely has a left lower back muscle strain.  • Patient advised to alternate Ibuprofen and Tylenol, apply a warm compress on her left lower back, stretches, massage, encouraged walking around, and drink plenty of fluids. She was instructed to return if her back pain continues or worsens, or if she begins experiencing symptoms of a UTI.         Differential diagnosis, natural history, supportive care, and indications for immediate follow-up discussed.    Advised the patient to follow-up with the primary care physician for recheck, reevaluation, and consideration of further management.    Time spent evaluating the patient was 32 minutes which included preparing for the visit, obtaining history, examination, ordering labs/tests/procedures/medications, independent interpretation, discussion of plan, counseling/education, medical information reconciliation, and documentation into chart.     Brandy CORTEZ Student

## 2022-06-04 LAB
BACTERIA UR CULT: NORMAL
SIGNIFICANT IND 70042: NORMAL
SITE SITE: NORMAL
SOURCE SOURCE: NORMAL

## 2022-07-20 ENCOUNTER — HOSPITAL ENCOUNTER (OUTPATIENT)
Dept: LAB | Facility: MEDICAL CENTER | Age: 67
End: 2022-07-20
Attending: STUDENT IN AN ORGANIZED HEALTH CARE EDUCATION/TRAINING PROGRAM
Payer: MEDICARE

## 2022-07-20 LAB
25(OH)D3 SERPL-MCNC: 45 NG/ML (ref 30–100)
ALBUMIN SERPL BCP-MCNC: 4.7 G/DL (ref 3.2–4.9)
ALBUMIN/GLOB SERPL: 1.6 G/DL
ALP SERPL-CCNC: 68 U/L (ref 30–99)
ALT SERPL-CCNC: 23 U/L (ref 2–50)
ANION GAP SERPL CALC-SCNC: 12 MMOL/L (ref 7–16)
AST SERPL-CCNC: 17 U/L (ref 12–45)
BASOPHILS # BLD AUTO: 1 % (ref 0–1.8)
BASOPHILS # BLD: 0.07 K/UL (ref 0–0.12)
BILIRUB SERPL-MCNC: 0.6 MG/DL (ref 0.1–1.5)
BUN SERPL-MCNC: 23 MG/DL (ref 8–22)
CALCIUM SERPL-MCNC: 9.5 MG/DL (ref 8.5–10.5)
CHLORIDE SERPL-SCNC: 102 MMOL/L (ref 96–112)
CO2 SERPL-SCNC: 26 MMOL/L (ref 20–33)
CREAT SERPL-MCNC: 0.72 MG/DL (ref 0.5–1.4)
EOSINOPHIL # BLD AUTO: 0.28 K/UL (ref 0–0.51)
EOSINOPHIL NFR BLD: 4.1 % (ref 0–6.9)
ERYTHROCYTE [DISTWIDTH] IN BLOOD BY AUTOMATED COUNT: 42.1 FL (ref 35.9–50)
EST. AVERAGE GLUCOSE BLD GHB EST-MCNC: 123 MG/DL
FASTING STATUS PATIENT QL REPORTED: NORMAL
FERRITIN SERPL-MCNC: 271 NG/ML (ref 10–291)
GFR SERPLBLD CREATININE-BSD FMLA CKD-EPI: 92 ML/MIN/1.73 M 2
GLOBULIN SER CALC-MCNC: 2.9 G/DL (ref 1.9–3.5)
GLUCOSE SERPL-MCNC: 100 MG/DL (ref 65–99)
HBA1C MFR BLD: 5.9 % (ref 4–5.6)
HCT VFR BLD AUTO: 47.9 % (ref 37–47)
HGB BLD-MCNC: 15.5 G/DL (ref 12–16)
IMM GRANULOCYTES # BLD AUTO: 0.01 K/UL (ref 0–0.11)
IMM GRANULOCYTES NFR BLD AUTO: 0.1 % (ref 0–0.9)
LYMPHOCYTES # BLD AUTO: 1.64 K/UL (ref 1–4.8)
LYMPHOCYTES NFR BLD: 24.1 % (ref 22–41)
MCH RBC QN AUTO: 28.7 PG (ref 27–33)
MCHC RBC AUTO-ENTMCNC: 32.4 G/DL (ref 33.6–35)
MCV RBC AUTO: 88.7 FL (ref 81.4–97.8)
MONOCYTES # BLD AUTO: 0.58 K/UL (ref 0–0.85)
MONOCYTES NFR BLD AUTO: 8.5 % (ref 0–13.4)
NEUTROPHILS # BLD AUTO: 4.23 K/UL (ref 2–7.15)
NEUTROPHILS NFR BLD: 62.2 % (ref 44–72)
NRBC # BLD AUTO: 0 K/UL
NRBC BLD-RTO: 0 /100 WBC
PLATELET # BLD AUTO: 264 K/UL (ref 164–446)
PMV BLD AUTO: 9.8 FL (ref 9–12.9)
POTASSIUM SERPL-SCNC: 5 MMOL/L (ref 3.6–5.5)
PROT SERPL-MCNC: 7.6 G/DL (ref 6–8.2)
RBC # BLD AUTO: 5.4 M/UL (ref 4.2–5.4)
SODIUM SERPL-SCNC: 140 MMOL/L (ref 135–145)
T4 FREE SERPL-MCNC: 1.27 NG/DL (ref 0.93–1.7)
TSH SERPL DL<=0.005 MIU/L-ACNC: 1.47 UIU/ML (ref 0.38–5.33)
WBC # BLD AUTO: 6.8 K/UL (ref 4.8–10.8)

## 2022-07-20 PROCEDURE — 84439 ASSAY OF FREE THYROXINE: CPT

## 2022-07-20 PROCEDURE — 82306 VITAMIN D 25 HYDROXY: CPT

## 2022-07-20 PROCEDURE — 82728 ASSAY OF FERRITIN: CPT | Mod: GA

## 2022-07-20 PROCEDURE — 84443 ASSAY THYROID STIM HORMONE: CPT

## 2022-07-20 PROCEDURE — 36415 COLL VENOUS BLD VENIPUNCTURE: CPT

## 2022-07-20 PROCEDURE — 85025 COMPLETE CBC W/AUTO DIFF WBC: CPT

## 2022-07-20 PROCEDURE — 83036 HEMOGLOBIN GLYCOSYLATED A1C: CPT | Mod: GA

## 2022-07-20 PROCEDURE — 80053 COMPREHEN METABOLIC PANEL: CPT

## 2022-11-29 ENCOUNTER — OFFICE VISIT (OUTPATIENT)
Dept: URGENT CARE | Facility: PHYSICIAN GROUP | Age: 67
End: 2022-11-29
Payer: MEDICARE

## 2022-11-29 VITALS
HEIGHT: 65 IN | TEMPERATURE: 98.6 F | WEIGHT: 293 LBS | HEART RATE: 86 BPM | OXYGEN SATURATION: 96 % | DIASTOLIC BLOOD PRESSURE: 80 MMHG | BODY MASS INDEX: 48.82 KG/M2 | RESPIRATION RATE: 18 BRPM | SYSTOLIC BLOOD PRESSURE: 130 MMHG

## 2022-11-29 DIAGNOSIS — J40 BRONCHITIS: ICD-10-CM

## 2022-11-29 PROCEDURE — 99214 OFFICE O/P EST MOD 30 MIN: CPT | Performed by: STUDENT IN AN ORGANIZED HEALTH CARE EDUCATION/TRAINING PROGRAM

## 2022-11-29 RX ORDER — FLUTICASONE PROPIONATE 50 MCG
2 SPRAY, SUSPENSION (ML) NASAL
Qty: 16 G | Refills: 1 | Status: SHIPPED | OUTPATIENT
Start: 2022-11-29

## 2022-11-29 RX ORDER — FEXOFENADINE HCL 180 MG/1
180 TABLET ORAL DAILY
Qty: 30 TABLET | Refills: 0 | Status: SHIPPED | OUTPATIENT
Start: 2022-11-29

## 2022-11-29 RX ORDER — PREDNISONE 20 MG/1
40 TABLET ORAL DAILY
Qty: 10 TABLET | Refills: 0 | Status: SHIPPED | OUTPATIENT
Start: 2022-11-29 | End: 2022-12-04

## 2022-11-29 ASSESSMENT — FIBROSIS 4 INDEX: FIB4 SCORE: 0.9

## 2022-11-29 NOTE — PROGRESS NOTES
"Subjective:   CHIEF COMPLAINT  Chief Complaint   Patient presents with    Cough     3 weeks to a month       HPI  Elizabeth Mendoza is a 67 y.o. female who presents with a chief complaint of a dry cough for approximately 3 weeks.  Reports initial onset the patient felt like \"something went down the wrong tube\" and developed a coughing fit.  Since that time she has had a persistent dry cough.  She has a history of recurrent bronchitis and tried using some albuterol which helps with the wheezing and shortness of breath.  Says there are no specific triggers.  Symptoms can keep her up at night.  She has not had any fevers.  No nausea or vomiting or systemic symptoms.  She took a home COVID test which was negative.    REVIEW OF SYSTEMS  General: no fever or chills  GI: no nausea or vomiting  See HPI for further details.    PAST MEDICAL HISTORY  Patient Active Problem List    Diagnosis Date Noted    Localized edema 07/19/2021    Chronic bilateral low back pain without sciatica 01/26/2021    Lower abdominal pain 05/04/2020    Morbid obesity with BMI of 45.0-49.9, adult ([facility]) 11/22/2019    Viral upper respiratory tract infection 01/04/2017    Anxiety 02/24/2016    Vitamin D deficiency 02/23/2016    Reactive airway disease without complication 11/12/2014    Incisional hernia 03/31/2014    Obesity 03/06/2013    Hypertension     Hyperlipidemia     Hypothyroid        SURGICAL HISTORY   has a past surgical history that includes lumpectomy (11/09); cyst excision (1990); tonsillectomy (1960); tubal ligation (1987); jamia by laparoscopy (1996); colon resection (2006); appendectomy (2006); colonoscopy (2006/2014); and ventral hernia repair (3/31/2014).    ALLERGIES  Allergies   Allergen Reactions    Biaxin [Clarithromycin] Vomiting    Cipro Xr     Pain Relief        CURRENT MEDICATIONS  albuterol Aers  aspirin EC Tbec  cholecalciferol Caps  famotidine Tabs  fexofenadine  fluticasone  furosemide Tabs  ibuprofen Tabs  irbesartan " "Tabs  levothyroxine Tabs  potassium chloride SA Tbcr  predniSONE Tabs  simvastatin Tabs    SOCIAL HISTORY  Social History     Tobacco Use    Smoking status: Never    Smokeless tobacco: Never   Vaping Use    Vaping Use: Never used   Substance and Sexual Activity    Alcohol use: No     Alcohol/week: 0.0 oz    Drug use: No    Sexual activity: Yes     Partners: Male       FAMILY HISTORY  Family History   Problem Relation Age of Onset    Stroke Mother     Hypertension Mother     Heart Disease Father 55    Diabetes Other           Objective:   PHYSICAL EXAM  VITAL SIGNS: /80   Pulse 86   Temp 37 °C (98.6 °F) (Temporal)   Resp 18   Ht 1.651 m (5' 5\")   Wt (!) 137 kg (301 lb)   SpO2 96%   BMI 50.09 kg/m²     Gen: no acute distress, normal voice  Skin: dry, intact, moist mucosal membranes  Eyes: No conjunctival injection bilaterally.  Neck: Normal range of motion. No meningeal signs.   Lungs: Scattered wheezing w/ symmetric expansion.  No crackles.  CV: RRR w/o murmurs or clicks  Psych: normal affect, normal judgement, alert, awake    Assessment/Plan:     1. Bronchitis  fluticasone (FLONASE) 50 MCG/ACT nasal spray    predniSONE (DELTASONE) 20 MG Tab    fexofenadine (ALLEGRA) 180 MG tablet      Chronic issue with acute exacerbation. Suspect there is a component of reactive airway disease given the chronicity. She has never been diagnosed with asthma.  - Ordered Rx for prednisone 40 mg p.o. daily x5 days.  Side effects discussed  - Ordered Rx for Allegra next  - Ordered Rx for Flonase  - Encourage patient follow-up with her PCP for reevaluation continue management.  Possibly would benefit from starting daily maintenance inhaler but will defer to primary care for long-term management.  - Return to urgent care any new/worsening symptoms or further questions or concerns.  Patient understood everything discussed.  All questions were answered.      Differential diagnosis, natural history, supportive care, and " indications for immediate follow-up discussed. All questions answered. Patient agrees with the plan of care.    Follow-up as needed if symptoms worsen or fail to improve to PCP, Urgent care or Emergency Room.    Please note that this dictation was created using voice recognition software. I have made a reasonable attempt to correct obvious errors, but I expect that there are errors of grammar and possibly content that I did not discover before finalizing the note.

## 2022-12-19 DIAGNOSIS — J40 BRONCHITIS: ICD-10-CM

## 2022-12-30 ENCOUNTER — OFFICE VISIT (OUTPATIENT)
Dept: URGENT CARE | Facility: PHYSICIAN GROUP | Age: 67
End: 2022-12-30
Payer: MEDICARE

## 2022-12-30 VITALS
HEART RATE: 98 BPM | SYSTOLIC BLOOD PRESSURE: 130 MMHG | OXYGEN SATURATION: 98 % | TEMPERATURE: 98.4 F | RESPIRATION RATE: 16 BRPM | WEIGHT: 293 LBS | DIASTOLIC BLOOD PRESSURE: 78 MMHG | HEIGHT: 63 IN | BODY MASS INDEX: 51.91 KG/M2

## 2022-12-30 DIAGNOSIS — R05.2 SUBACUTE COUGH: ICD-10-CM

## 2022-12-30 PROCEDURE — 99213 OFFICE O/P EST LOW 20 MIN: CPT | Performed by: NURSE PRACTITIONER

## 2022-12-30 RX ORDER — BENZONATATE 100 MG/1
100 CAPSULE ORAL 3 TIMES DAILY PRN
Qty: 30 CAPSULE | Refills: 0 | Status: SHIPPED | OUTPATIENT
Start: 2022-12-30 | End: 2024-03-25 | Stop reason: SDUPTHER

## 2022-12-30 ASSESSMENT — ENCOUNTER SYMPTOMS
COUGH: 1
SORE THROAT: 0
WHEEZING: 1

## 2022-12-30 ASSESSMENT — FIBROSIS 4 INDEX: FIB4 SCORE: 0.9

## 2022-12-30 NOTE — PROGRESS NOTES
Subjective:     Elizabeth Mendoza is a 67 y.o. female who presents for Cough (Was her 1 month ago for a cough after she finished her medication she felt better, the cough has come back an she has a surgery scheduled this Friday.)      Return of cough, seen 12/19. Returned a week after. GERD, cough wakes her up at night. Takes Coricidin. States cough gets better as the day progresses. Pepcid daily, prilosec. Hx of hiatal hernia repair.     12/29 CXR No evidence of active card pulm disease. Steroid helped some. Has inhaler.     Cough  This is a recurrent problem. Associated symptoms include wheezing. Pertinent negatives include no sore throat.     Past Medical History:   Diagnosis Date    Anxiety 2/24/2016    Bronchitis     Cholesterol blood decreased     Cough 11/12/2014    Diverticulitis     Heart burn     Hyperlipidemia     Hypertension     Hypothyroid     Other specified disorder of intestines     Viral upper respiratory tract infection 1/4/2017    Vitamin D deficiency 2/23/2016       Past Surgical History:   Procedure Laterality Date    VENTRAL HERNIA REPAIR  3/31/2014    Performed by John H Ganser, M.D. at [facility]    LUMPECTOMY  11/09    benignleft breast lump removed    COLON RESECTION  2006    APPENDECTOMY  2006    GERRY BY LAPAROSCOPY  1996    CYST EXCISION  1990    b/l breast    TUBAL LIGATION  1987    right oophorectomy    TONSILLECTOMY  1960    COLONOSCOPY  2006/2014    x 2       Social History     Socioeconomic History    Marital status:      Spouse name: Not on file    Number of children: Not on file    Years of education: Not on file    Highest education level: Not on file   Occupational History    Not on file   Tobacco Use    Smoking status: Never    Smokeless tobacco: Never   Vaping Use    Vaping Use: Never used   Substance and Sexual Activity    Alcohol use: No     Alcohol/week: 0.0 oz    Drug use: No    Sexual activity: Yes     Partners: Male   Other Topics Concern    Not  "on file   Social History Narrative    Not on file     Social Determinants of Health     Financial Resource Strain: Not on file   Food Insecurity: Not on file   Transportation Needs: Not on file   Physical Activity: Not on file   Stress: Not on file   Social Connections: Not on file   Intimate Partner Violence: Not on file   Housing Stability: Not on file        Family History   Problem Relation Age of Onset    Stroke Mother     Hypertension Mother     Heart Disease Father 55    Diabetes Other         Allergies   Allergen Reactions    Biaxin [Clarithromycin] Vomiting    Cipro Xr     Pain Relief        Review of Systems   HENT:  Negative for sore throat.    Respiratory:  Positive for cough and wheezing.    All other systems reviewed and are negative.     Objective:   /78   Pulse 98   Temp 36.9 °C (98.4 °F) (Temporal)   Resp 16   Ht 1.6 m (5' 3\")   Wt (!) 133 kg (294 lb)   SpO2 98%   BMI 52.08 kg/m²     Physical Exam  Vitals reviewed.   Constitutional:       General: She is not in acute distress.     Appearance: She is well-developed.   HENT:      Head: Normocephalic and atraumatic.      Right Ear: External ear normal.      Left Ear: External ear normal.      Nose: Nose normal.      Mouth/Throat:      Pharynx: Oropharynx is clear.   Eyes:      Conjunctiva/sclera: Conjunctivae normal.   Cardiovascular:      Rate and Rhythm: Normal rate.   Pulmonary:      Effort: Pulmonary effort is normal. No respiratory distress.      Breath sounds: Normal breath sounds. No stridor. No wheezing, rhonchi or rales.   Musculoskeletal:         General: Normal range of motion.      Cervical back: Normal range of motion.   Skin:     General: Skin is warm and dry.      Findings: No rash.   Neurological:      Mental Status: She is alert and oriented to person, place, and time.      GCS: GCS eye subscore is 4. GCS verbal subscore is 5. GCS motor subscore is 6.   Psychiatric:         Speech: Speech normal.         Behavior: Behavior " normal.         Thought Content: Thought content normal.         Judgment: Judgment normal.       Assessment/Plan:   1. Subacute cough  - benzonatate (TESSALON) 100 MG Cap; Take 1 Capsule by mouth 3 times a day as needed for Cough.  Dispense: 30 Capsule; Refill: 0  Symptomatic care.  -Oral hydration and rest.   -Cough control: nonpharmacologic options for cough relief such as throat lozenges, hot tea, honey.  -Over the counter expectorant as directed; Guaifenesin (Mucinex).  -Flonase for post nasal drip symptoms.   -Antacids for acid reflux.    Follow up with PCP. Follow up for increased or persistent shortness of breath or wheezing, fevers, elevated heart rate, weakness, prolonged cough, chest pain, or any other concerns.    Had negative CXR. Discussed common causes of a persistent cough: including GERD, bronchitis or other recent URI, allergies, post nasal drip. Not on an ACEi. GERD, sleep apnea    Differential diagnosis, natural history, supportive care, and indications for immediate follow-up discussed.

## 2023-01-02 NOTE — PATIENT INSTRUCTIONS
Symptomatic care.  -Oral hydration and rest.   -Cough control: nonpharmacologic options for cough relief such as throat lozenges, hot tea, honey.  -Over the counter expectorant as directed; Guaifenesin (Mucinex).  -Flonase for post nasal drip symptoms.   -Antacids for acid reflux.    Follow up with PCP. Follow up for increased or persistent shortness of breath or wheezing, fevers, elevated heart rate, weakness, prolonged cough, chest pain, or any other concerns.

## 2023-03-19 RX ORDER — FEXOFENADINE HCL 180 MG/1
180 TABLET ORAL DAILY
Qty: 90 TABLET | Refills: 1 | OUTPATIENT
Start: 2023-03-19

## 2023-04-03 ENCOUNTER — HOSPITAL ENCOUNTER (OUTPATIENT)
Dept: LAB | Facility: MEDICAL CENTER | Age: 68
End: 2023-04-03
Attending: INTERNAL MEDICINE
Payer: MEDICARE

## 2023-04-03 LAB
ALBUMIN SERPL BCP-MCNC: 4.1 G/DL (ref 3.2–4.9)
ALBUMIN/GLOB SERPL: 1.2 G/DL
ALP SERPL-CCNC: 69 U/L (ref 30–99)
ALT SERPL-CCNC: 16 U/L (ref 2–50)
ANION GAP SERPL CALC-SCNC: 11 MMOL/L (ref 7–16)
AST SERPL-CCNC: 13 U/L (ref 12–45)
BILIRUB SERPL-MCNC: 0.4 MG/DL (ref 0.1–1.5)
BUN SERPL-MCNC: 18 MG/DL (ref 8–22)
CALCIUM ALBUM COR SERPL-MCNC: 9.1 MG/DL (ref 8.5–10.5)
CALCIUM SERPL-MCNC: 9.2 MG/DL (ref 8.5–10.5)
CHLORIDE SERPL-SCNC: 99 MMOL/L (ref 96–112)
CO2 SERPL-SCNC: 27 MMOL/L (ref 20–33)
CREAT SERPL-MCNC: 0.67 MG/DL (ref 0.5–1.4)
FASTING STATUS PATIENT QL REPORTED: NORMAL
GFR SERPLBLD CREATININE-BSD FMLA CKD-EPI: 95 ML/MIN/1.73 M 2
GLOBULIN SER CALC-MCNC: 3.3 G/DL (ref 1.9–3.5)
GLUCOSE SERPL-MCNC: 97 MG/DL (ref 65–99)
IRON SATN MFR SERPL: 11 % (ref 15–55)
IRON SERPL-MCNC: 36 UG/DL (ref 40–170)
POTASSIUM SERPL-SCNC: 3.9 MMOL/L (ref 3.6–5.5)
PROT SERPL-MCNC: 7.4 G/DL (ref 6–8.2)
SODIUM SERPL-SCNC: 137 MMOL/L (ref 135–145)
TIBC SERPL-MCNC: 322 UG/DL (ref 250–450)
UIBC SERPL-MCNC: 286 UG/DL (ref 110–370)

## 2023-04-03 PROCEDURE — 80053 COMPREHEN METABOLIC PANEL: CPT

## 2023-04-03 PROCEDURE — 83540 ASSAY OF IRON: CPT

## 2023-04-03 PROCEDURE — 83550 IRON BINDING TEST: CPT

## 2023-04-03 PROCEDURE — 36415 COLL VENOUS BLD VENIPUNCTURE: CPT

## 2023-04-06 ENCOUNTER — HOSPITAL ENCOUNTER (OUTPATIENT)
Dept: LAB | Facility: MEDICAL CENTER | Age: 68
End: 2023-04-06
Attending: INTERNAL MEDICINE
Payer: MEDICARE

## 2023-04-06 LAB
BASOPHILS # BLD AUTO: 1 % (ref 0–1.8)
BASOPHILS # BLD: 0.14 K/UL (ref 0–0.12)
EOSINOPHIL # BLD AUTO: 0.28 K/UL (ref 0–0.51)
EOSINOPHIL NFR BLD: 1.9 % (ref 0–6.9)
ERYTHROCYTE [DISTWIDTH] IN BLOOD BY AUTOMATED COUNT: 63 FL (ref 35.9–50)
HCT VFR BLD AUTO: 40.1 % (ref 37–47)
HGB BLD-MCNC: 12.2 G/DL (ref 12–16)
IMM GRANULOCYTES # BLD AUTO: 0.06 K/UL (ref 0–0.11)
IMM GRANULOCYTES NFR BLD AUTO: 0.4 % (ref 0–0.9)
LYMPHOCYTES # BLD AUTO: 1.99 K/UL (ref 1–4.8)
LYMPHOCYTES NFR BLD: 13.8 % (ref 22–41)
MCH RBC QN AUTO: 26.3 PG (ref 27–33)
MCHC RBC AUTO-ENTMCNC: 30.4 G/DL (ref 33.6–35)
MCV RBC AUTO: 86.6 FL (ref 81.4–97.8)
MONOCYTES # BLD AUTO: 1.49 K/UL (ref 0–0.85)
MONOCYTES NFR BLD AUTO: 10.3 % (ref 0–13.4)
NEUTROPHILS # BLD AUTO: 10.51 K/UL (ref 2–7.15)
NEUTROPHILS NFR BLD: 72.6 % (ref 44–72)
NRBC # BLD AUTO: 0 K/UL
NRBC BLD-RTO: 0 /100 WBC
PLATELET # BLD AUTO: 298 K/UL (ref 164–446)
PMV BLD AUTO: 9.8 FL (ref 9–12.9)
RBC # BLD AUTO: 4.63 M/UL (ref 4.2–5.4)
WBC # BLD AUTO: 14.5 K/UL (ref 4.8–10.8)

## 2023-04-06 PROCEDURE — 85025 COMPLETE CBC W/AUTO DIFF WBC: CPT

## 2023-04-21 ENCOUNTER — HOSPITAL ENCOUNTER (OUTPATIENT)
Dept: LAB | Facility: MEDICAL CENTER | Age: 68
End: 2023-04-21
Attending: STUDENT IN AN ORGANIZED HEALTH CARE EDUCATION/TRAINING PROGRAM
Payer: MEDICARE

## 2023-04-21 LAB
ALBUMIN SERPL BCP-MCNC: 4 G/DL (ref 3.2–4.9)
ALBUMIN/GLOB SERPL: 1.1 G/DL
ALP SERPL-CCNC: 62 U/L (ref 30–99)
ALT SERPL-CCNC: 11 U/L (ref 2–50)
ANION GAP SERPL CALC-SCNC: 14 MMOL/L (ref 7–16)
AST SERPL-CCNC: 15 U/L (ref 12–45)
BASOPHILS # BLD AUTO: 1 % (ref 0–1.8)
BASOPHILS # BLD: 0.08 K/UL (ref 0–0.12)
BILIRUB SERPL-MCNC: 0.2 MG/DL (ref 0.1–1.5)
BUN SERPL-MCNC: 13 MG/DL (ref 8–22)
CALCIUM ALBUM COR SERPL-MCNC: 10.1 MG/DL (ref 8.5–10.5)
CALCIUM SERPL-MCNC: 10.1 MG/DL (ref 8.5–10.5)
CHLORIDE SERPL-SCNC: 101 MMOL/L (ref 96–112)
CO2 SERPL-SCNC: 30 MMOL/L (ref 20–33)
CREAT SERPL-MCNC: 1.04 MG/DL (ref 0.5–1.4)
EOSINOPHIL # BLD AUTO: 0.27 K/UL (ref 0–0.51)
EOSINOPHIL NFR BLD: 3.5 % (ref 0–6.9)
ERYTHROCYTE [DISTWIDTH] IN BLOOD BY AUTOMATED COUNT: 59.1 FL (ref 35.9–50)
EST. AVERAGE GLUCOSE BLD GHB EST-MCNC: 114 MG/DL
FERRITIN SERPL-MCNC: 177 NG/ML (ref 10–291)
FOLATE SERPL-MCNC: 15.5 NG/ML
GFR SERPLBLD CREATININE-BSD FMLA CKD-EPI: 59 ML/MIN/1.73 M 2
GLOBULIN SER CALC-MCNC: 3.6 G/DL (ref 1.9–3.5)
GLUCOSE SERPL-MCNC: 112 MG/DL (ref 65–99)
HBA1C MFR BLD: 5.6 % (ref 4–5.6)
HCT VFR BLD AUTO: 40.9 % (ref 37–47)
HGB BLD-MCNC: 12.3 G/DL (ref 12–16)
IMM GRANULOCYTES # BLD AUTO: 0.03 K/UL (ref 0–0.11)
IMM GRANULOCYTES NFR BLD AUTO: 0.4 % (ref 0–0.9)
IRON SATN MFR SERPL: 17 % (ref 15–55)
IRON SERPL-MCNC: 53 UG/DL (ref 40–170)
LYMPHOCYTES # BLD AUTO: 1.75 K/UL (ref 1–4.8)
LYMPHOCYTES NFR BLD: 22.8 % (ref 22–41)
MCH RBC QN AUTO: 25.9 PG (ref 27–33)
MCHC RBC AUTO-ENTMCNC: 30.1 G/DL (ref 33.6–35)
MCV RBC AUTO: 86.3 FL (ref 81.4–97.8)
MONOCYTES # BLD AUTO: 0.56 K/UL (ref 0–0.85)
MONOCYTES NFR BLD AUTO: 7.3 % (ref 0–13.4)
NEUTROPHILS # BLD AUTO: 5 K/UL (ref 2–7.15)
NEUTROPHILS NFR BLD: 65 % (ref 44–72)
NRBC # BLD AUTO: 0 K/UL
NRBC BLD-RTO: 0 /100 WBC
PLATELET # BLD AUTO: 496 K/UL (ref 164–446)
PMV BLD AUTO: 10.2 FL (ref 9–12.9)
POTASSIUM SERPL-SCNC: 3.6 MMOL/L (ref 3.6–5.5)
PROT SERPL-MCNC: 7.6 G/DL (ref 6–8.2)
RBC # BLD AUTO: 4.74 M/UL (ref 4.2–5.4)
SODIUM SERPL-SCNC: 145 MMOL/L (ref 135–145)
T4 FREE SERPL-MCNC: 1.54 NG/DL (ref 0.93–1.7)
TIBC SERPL-MCNC: 305 UG/DL (ref 250–450)
TSH SERPL DL<=0.005 MIU/L-ACNC: 15 UIU/ML (ref 0.38–5.33)
UIBC SERPL-MCNC: 252 UG/DL (ref 110–370)
URATE SERPL-MCNC: 7.2 MG/DL (ref 1.9–8.2)
VIT B12 SERPL-MCNC: 392 PG/ML (ref 211–911)
WBC # BLD AUTO: 7.7 K/UL (ref 4.8–10.8)

## 2023-04-21 PROCEDURE — 85025 COMPLETE CBC W/AUTO DIFF WBC: CPT

## 2023-04-21 PROCEDURE — 83540 ASSAY OF IRON: CPT

## 2023-04-21 PROCEDURE — 80053 COMPREHEN METABOLIC PANEL: CPT

## 2023-04-21 PROCEDURE — 84439 ASSAY OF FREE THYROXINE: CPT

## 2023-04-21 PROCEDURE — 84443 ASSAY THYROID STIM HORMONE: CPT

## 2023-04-21 PROCEDURE — 82728 ASSAY OF FERRITIN: CPT

## 2023-04-21 PROCEDURE — 84550 ASSAY OF BLOOD/URIC ACID: CPT

## 2023-04-21 PROCEDURE — 82607 VITAMIN B-12: CPT

## 2023-04-21 PROCEDURE — 83550 IRON BINDING TEST: CPT

## 2023-04-21 PROCEDURE — 83036 HEMOGLOBIN GLYCOSYLATED A1C: CPT | Mod: GA

## 2023-04-21 PROCEDURE — 36415 COLL VENOUS BLD VENIPUNCTURE: CPT

## 2023-04-21 PROCEDURE — 82746 ASSAY OF FOLIC ACID SERUM: CPT

## 2023-05-01 ENCOUNTER — HOSPITAL ENCOUNTER (OUTPATIENT)
Dept: LAB | Facility: MEDICAL CENTER | Age: 68
End: 2023-05-01
Attending: STUDENT IN AN ORGANIZED HEALTH CARE EDUCATION/TRAINING PROGRAM
Payer: MEDICARE

## 2023-05-01 LAB — TSH SERPL DL<=0.005 MIU/L-ACNC: 4.08 UIU/ML (ref 0.38–5.33)

## 2023-05-01 PROCEDURE — 84443 ASSAY THYROID STIM HORMONE: CPT

## 2023-05-01 PROCEDURE — 36415 COLL VENOUS BLD VENIPUNCTURE: CPT

## 2024-03-25 ENCOUNTER — OFFICE VISIT (OUTPATIENT)
Dept: URGENT CARE | Facility: PHYSICIAN GROUP | Age: 69
End: 2024-03-25
Payer: MEDICARE

## 2024-03-25 VITALS
RESPIRATION RATE: 18 BRPM | DIASTOLIC BLOOD PRESSURE: 98 MMHG | TEMPERATURE: 97.6 F | HEIGHT: 64 IN | SYSTOLIC BLOOD PRESSURE: 158 MMHG | OXYGEN SATURATION: 94 % | HEART RATE: 101 BPM | WEIGHT: 293 LBS | BODY MASS INDEX: 50.02 KG/M2

## 2024-03-25 DIAGNOSIS — J45.21 MILD INTERMITTENT ASTHMA WITH EXACERBATION: ICD-10-CM

## 2024-03-25 DIAGNOSIS — R03.0 ELEVATED BLOOD PRESSURE READING: ICD-10-CM

## 2024-03-25 DIAGNOSIS — R00.0 TACHYCARDIA: ICD-10-CM

## 2024-03-25 PROCEDURE — 3077F SYST BP >= 140 MM HG: CPT | Performed by: FAMILY MEDICINE

## 2024-03-25 PROCEDURE — 3080F DIAST BP >= 90 MM HG: CPT | Performed by: FAMILY MEDICINE

## 2024-03-25 PROCEDURE — 99214 OFFICE O/P EST MOD 30 MIN: CPT | Performed by: FAMILY MEDICINE

## 2024-03-25 RX ORDER — METHYLPREDNISOLONE 4 MG/1
TABLET ORAL
Qty: 21 TABLET | Refills: 0 | Status: SHIPPED | OUTPATIENT
Start: 2024-03-25

## 2024-03-25 RX ORDER — BENZONATATE 100 MG/1
100 CAPSULE ORAL 3 TIMES DAILY PRN
Qty: 30 CAPSULE | Refills: 0 | Status: SHIPPED | OUTPATIENT
Start: 2024-03-25

## 2024-03-25 ASSESSMENT — FIBROSIS 4 INDEX: FIB4 SCORE: 0.62

## 2024-03-25 NOTE — PROGRESS NOTES
"  Subjective:      68 y.o. female presents to urgent care for cough that started on Wednesday.  No other cold symptoms such as fever, sore throat, body ache, or diarrhea.  She does have a new diagnosis of asthma for which she uses Flovent and albuterol.  She has been needing her albuterol more since Wednesday.    Asthma exacerbation considerations:  -Compliance with chronic asthma medications: yes  -Prior history of intubation needed to asthma: no  -Emergency department visit within the last year: no  -Intensive care unit admissions within the last year: no  -Steroid use within the last year: no  -Tobacco product use: no    Heart rate and blood pressure are both elevated today in urgent care. She has a history of hypertension for which she takes irbesartant. She denies any chest pain or palpitations.     She denies any other questions or concerns at this time.    Current problem list, medication, and past medical/surgical history were reviewed in Epic.    ROS  See HPI     Objective:      BP (!) 158/98   Pulse (!) 101   Temp 36.4 °C (97.6 °F) (Temporal)   Resp 18   Ht 1.626 m (5' 4\")   Wt (!) 136 kg (300 lb)   SpO2 94%   BMI 51.49 kg/m²     Physical Exam  Constitutional:       General: She is not in acute distress.     Appearance: She is not diaphoretic.   HENT:      Right Ear: Tympanic membrane, ear canal and external ear normal.      Left Ear: Tympanic membrane, ear canal and external ear normal.      Mouth/Throat:      Tongue: Tongue does not deviate from midline.      Palate: No lesions.      Pharynx: No oropharyngeal exudate or posterior oropharyngeal erythema.      Tonsils: No tonsillar exudate. 0 on the right. 0 on the left.   Cardiovascular:      Rate and Rhythm: Normal rate and regular rhythm.      Heart sounds: Normal heart sounds.   Pulmonary:      Effort: Pulmonary effort is normal. No respiratory distress.      Breath sounds: Normal breath sounds.   Neurological:      Mental Status: She is alert. "   Psychiatric:         Mood and Affect: Affect normal.         Judgment: Judgment normal.       Assessment/Plan:     1. Mild intermittent asthma with exacerbation  2. Tachycardia  3. Elevated blood pressure reading  Systemic symptoms seen through elevated blood pressure and heart rate.  These are asymptomatic.  Continue with irbesartan as prescribed.  Acute on chronic problem with asthma, Medrol Dosepak and Tessalon Perles have been sent.  Continue with albuterol as prescribed.  - methylPREDNISolone (MEDROL DOSEPAK) 4 MG Tablet Therapy Pack; Follow schedule on package instructions.  Dispense: 21 Tablet; Refill: 0  - benzonatate (TESSALON) 100 MG Cap; Take 1 Capsule by mouth 3 times a day as needed for Cough.  Dispense: 30 Capsule; Refill: 0      Instructed to return to Urgent Care or nearest Emergency Department if symptoms fail to improve, for any change in condition, further concerns, or new concerning symptoms. Patient states understanding of the plan of care and discharge instructions.    Shirley Orr M.D.

## 2024-10-15 NOTE — TELEPHONE ENCOUNTER
[facility] CALLING BACK [facility] CALLED IN-- MISSING THE GRAMS PLEASE CALL  [phone]     Pt informed.